# Patient Record
Sex: FEMALE | Race: WHITE | Employment: PART TIME | URBAN - METROPOLITAN AREA
[De-identification: names, ages, dates, MRNs, and addresses within clinical notes are randomized per-mention and may not be internally consistent; named-entity substitution may affect disease eponyms.]

---

## 2017-01-10 ENCOUNTER — HOSPITAL ENCOUNTER (OUTPATIENT)
Age: 27
Discharge: HOME | End: 2017-01-10
Attending: FAMILY MEDICINE
Payer: COMMERCIAL

## 2017-01-10 ENCOUNTER — APPOINTMENT (OUTPATIENT)
Dept: GENERAL RADIOLOGY | Age: 27
End: 2017-01-10
Attending: FAMILY MEDICINE
Payer: COMMERCIAL

## 2017-01-10 VITALS
RESPIRATION RATE: 20 BRPM | HEART RATE: 116 BPM | SYSTOLIC BLOOD PRESSURE: 133 MMHG | OXYGEN SATURATION: 98 % | TEMPERATURE: 98 F | DIASTOLIC BLOOD PRESSURE: 79 MMHG | WEIGHT: 270 LBS

## 2017-01-10 DIAGNOSIS — R05.8 POST-VIRAL COUGH SYNDROME: Primary | ICD-10-CM

## 2017-01-10 PROCEDURE — 99214 OFFICE O/P EST MOD 30 MIN: CPT

## 2017-01-10 PROCEDURE — 71020 XR CHEST PA + LAT CHEST (CPT=71020): CPT

## 2017-01-10 PROCEDURE — 99213 OFFICE O/P EST LOW 20 MIN: CPT

## 2017-01-10 NOTE — ED PROVIDER NOTES
Patient Seen in: [facility] END    History   Patient presents with:  Cough/URI    Stated Complaint: 3 DAYS COUGH,SINUS CONGESTION    HPI  78-year-old female coming in with complaints of continuous cough, and cough spasms.   She was s 98%        Physical Exam    GEN: Not in any acute distress, making good conversation, answering appropriately   SKIN: No pallor, no erythema, no cyanosis, warm and dry  Eyes: wnl, normal conjunctiva   HEAD: Normocephalic, atraumatic  EENT: OP - wnl, moist, nosebleeds discussed - to avoid / reduce the risk of this use fragrance free vaseline to apply over the nasal septal mucosa)  · Daily antihistamine - Claritin OR Zyrtec in the AM (non-drowsy) and take benadryl 25 mg at night time  · Avoid pseudoephedrine o

## 2017-01-10 NOTE — ED INITIAL ASSESSMENT (HPI)
Seen here the day after promise, diagnosed with sinus/uri. On 10 days of medication. Recently got a massage & the next day started feeling the face/sinus congestion more. No fever. No vomit.  No pending dental work

## 2017-01-14 ENCOUNTER — HOSPITAL ENCOUNTER (OUTPATIENT)
Age: 27
Discharge: HOME | End: 2017-01-14
Payer: COMMERCIAL

## 2017-01-14 VITALS
BODY MASS INDEX: 40.92 KG/M2 | WEIGHT: 270 LBS | SYSTOLIC BLOOD PRESSURE: 119 MMHG | HEIGHT: 68 IN | TEMPERATURE: 99 F | DIASTOLIC BLOOD PRESSURE: 75 MMHG | HEART RATE: 117 BPM | RESPIRATION RATE: 20 BRPM | OXYGEN SATURATION: 100 %

## 2017-01-14 DIAGNOSIS — J20.9 BRONCHITIS, ACUTE, WITH BRONCHOSPASM: Primary | ICD-10-CM

## 2017-01-14 PROCEDURE — 99214 OFFICE O/P EST MOD 30 MIN: CPT

## 2017-01-14 PROCEDURE — 94640 AIRWAY INHALATION TREATMENT: CPT

## 2017-01-14 RX ORDER — PREDNISONE 20 MG/1
60 TABLET ORAL ONCE
Status: COMPLETED | OUTPATIENT
Start: 2017-01-14 | End: 2017-01-14

## 2017-01-14 RX ORDER — AZITHROMYCIN 250 MG/1
TABLET, FILM COATED ORAL
Qty: 1 PACKAGE | Refills: 0 | Status: SHIPPED | OUTPATIENT
Start: 2017-01-14 | End: 2017-01-19

## 2017-01-14 RX ORDER — PREDNISONE 20 MG/1
40 TABLET ORAL DAILY
Qty: 8 TABLET | Refills: 0 | Status: SHIPPED | OUTPATIENT
Start: 2017-01-14 | End: 2017-01-18

## 2017-01-14 RX ORDER — ALBUTEROL SULFATE 90 UG/1
2 AEROSOL, METERED RESPIRATORY (INHALATION) EVERY 4 HOURS PRN
Qty: 1 INHALER | Refills: 0 | Status: SHIPPED | OUTPATIENT
Start: 2017-01-14 | End: 2017-02-13

## 2017-01-14 RX ORDER — MONTELUKAST SODIUM 10 MG/1
10 TABLET ORAL NIGHTLY
Qty: 30 TABLET | Refills: 0 | Status: SHIPPED | OUTPATIENT
Start: 2017-01-14 | End: 2017-05-20

## 2017-01-14 RX ORDER — IPRATROPIUM BROMIDE AND ALBUTEROL SULFATE 2.5; .5 MG/3ML; MG/3ML
3 SOLUTION RESPIRATORY (INHALATION) ONCE
Status: COMPLETED | OUTPATIENT
Start: 2017-01-14 | End: 2017-01-14

## 2017-01-14 NOTE — ED INITIAL ASSESSMENT (HPI)
12/26- diagnosed with sinus infection and Rx of antibiotics. Patient re-assessed last Tuesday with a non productive cough.   Chest Xray - negative  Patient wheezing  Patient states non smoker  Denies any asthma

## 2017-01-14 NOTE — ED PROVIDER NOTES
Patient Seen in: [address] [facility]    History   Patient presents with:  Cough    Stated Complaint: cough/sob      HPI    33 yo female here with c/o wheezing and was sent over from the walk-in clinic.  PT had a chest xray 2 days ago that Temporal   SpO2 01/14/17 1604 100 %   O2 Device 01/14/17 1604 None (Room air)       Current:/75 mmHg  Pulse 117  Temp(Src) 98.5 °F (36.9 °C) (Temporal)  Resp 20  Ht 172.7 cm (5' 8\")  Wt 122.471 kg  BMI 41.06 kg/m2  SpO2 100%        Physical Exam   C Oral Tab  Take 2 tablets (40 mg total) by mouth daily.   Qty: 8 tablet Refills: 0    azithromycin (ZITHROMAX Z-JAYLENE) 250 MG Oral Tab  500 mg once followed by 250 mg daily x 4 days  Qty: 1 Package Refills: 0    Montelukast Sodium 10 MG Oral Tab  Take 1 tablet

## 2017-03-04 ENCOUNTER — OFFICE VISIT (OUTPATIENT)
Dept: OBGYN CLINIC | Facility: CLINIC | Age: 27
End: 2017-03-04

## 2017-03-04 DIAGNOSIS — Z30.40 CONTRACEPTIVE SURVEILLANCE: Primary | ICD-10-CM

## 2017-03-04 PROCEDURE — 96372 THER/PROPH/DIAG INJ SC/IM: CPT | Performed by: OBSTETRICS & GYNECOLOGY

## 2017-03-04 RX ORDER — METHYLPREDNISOLONE ACETATE 40 MG/ML
40 INJECTION, SUSPENSION INTRA-ARTICULAR; INTRALESIONAL; INTRAMUSCULAR; SOFT TISSUE ONCE
Status: COMPLETED | OUTPATIENT
Start: 2017-03-04 | End: 2017-08-19

## 2017-03-04 RX ORDER — MEDROXYPROGESTERONE ACETATE 150 MG/ML
150 INJECTION, SUSPENSION INTRAMUSCULAR ONCE
Status: COMPLETED | OUTPATIENT
Start: 2017-03-04 | End: 2017-03-04

## 2017-03-04 RX ADMIN — MEDROXYPROGESTERONE ACETATE 150 MG: 150 INJECTION, SUSPENSION INTRAMUSCULAR at 10:31:00

## 2017-05-18 RX ORDER — MEDROXYPROGESTERONE ACETATE 150 MG/ML
INJECTION, SUSPENSION INTRAMUSCULAR
Qty: 1 ML | Refills: 0 | Status: SHIPPED | OUTPATIENT
Start: 2017-05-18 | End: 2017-05-20

## 2017-05-20 ENCOUNTER — OFFICE VISIT (OUTPATIENT)
Dept: OBGYN CLINIC | Facility: CLINIC | Age: 27
End: 2017-05-20

## 2017-05-20 DIAGNOSIS — Z30.013 ENCOUNTER FOR INITIAL PRESCRIPTION OF INJECTABLE CONTRACEPTIVE: ICD-10-CM

## 2017-05-20 DIAGNOSIS — Z30.40 ENCOUNTER FOR SURVEILLANCE OF CONTRACEPTIVES, UNSPECIFIED CONTRACEPTIVE: Primary | ICD-10-CM

## 2017-05-20 PROCEDURE — 96372 THER/PROPH/DIAG INJ SC/IM: CPT | Performed by: OBSTETRICS & GYNECOLOGY

## 2017-05-20 RX ORDER — MEDROXYPROGESTERONE ACETATE 150 MG/ML
150 INJECTION, SUSPENSION INTRAMUSCULAR ONCE
Status: DISCONTINUED | OUTPATIENT
Start: 2017-05-20 | End: 2017-05-20

## 2017-05-20 RX ORDER — MEDROXYPROGESTERONE ACETATE 150 MG/ML
150 INJECTION, SUSPENSION INTRAMUSCULAR ONCE
Status: COMPLETED | OUTPATIENT
Start: 2017-05-20 | End: 2017-05-20

## 2017-05-20 RX ADMIN — MEDROXYPROGESTERONE ACETATE 150 MG: 150 INJECTION, SUSPENSION INTRAMUSCULAR at 10:10:00

## 2017-06-13 ENCOUNTER — HOSPITAL ENCOUNTER (OUTPATIENT)
Age: 27
Discharge: HOME | End: 2017-06-13
Payer: COMMERCIAL

## 2017-06-13 VITALS
SYSTOLIC BLOOD PRESSURE: 126 MMHG | DIASTOLIC BLOOD PRESSURE: 84 MMHG | HEART RATE: 98 BPM | TEMPERATURE: 98 F | OXYGEN SATURATION: 99 % | RESPIRATION RATE: 18 BRPM

## 2017-06-13 DIAGNOSIS — H65.02 ACUTE SEROUS OTITIS MEDIA OF LEFT EAR, RECURRENCE NOT SPECIFIED: Primary | ICD-10-CM

## 2017-06-13 PROCEDURE — 69209 REMOVE IMPACTED EAR WAX UNI: CPT

## 2017-06-13 PROCEDURE — 99213 OFFICE O/P EST LOW 20 MIN: CPT

## 2017-06-13 PROCEDURE — 99214 OFFICE O/P EST MOD 30 MIN: CPT

## 2017-06-13 RX ORDER — CETIRIZINE HYDROCHLORIDE 10 MG/1
10 TABLET ORAL DAILY
COMMUNITY
End: 2020-01-07

## 2017-06-13 RX ORDER — AMOXICILLIN 875 MG/1
875 TABLET, COATED ORAL 2 TIMES DAILY
Qty: 20 TABLET | Refills: 0 | Status: SHIPPED | OUTPATIENT
Start: 2017-06-13 | End: 2017-06-23

## 2017-06-13 NOTE — ED PROVIDER NOTES
Patient Seen in: THE [facility] In [facility]    History   Patient presents with:  Sinus Problem  Ear Problem    Stated Complaint: SINUS INFECTION/LT EAR CLOGGED    The history is provided by the patient.    40-year-old female who presents to the INFECTION/LT EAR CLOGGED  Other systems are as noted in HPI. Constitutional and vital signs reviewed. All other systems reviewed and negative except as noted above. PSFH elements reviewed from today and agreed except as otherwise stated in HPI. states they understand diagnosis, followup plan and agree with and understand  discharge instructions and plan. I answered all of the patient's questions prior to discharge.    Disposition and Plan     Clinical Impression:  Acute serous otitis media of left

## 2017-06-13 NOTE — ED INITIAL ASSESSMENT (HPI)
Patient presents with cc of sinus congestion/pressure and left ear clogged since 6/11/17. States has been dealing with seasonal allergies for the past couple of weeks. No fever.

## 2017-07-03 ENCOUNTER — HOSPITAL ENCOUNTER (OUTPATIENT)
Age: 27
Discharge: HOME | End: 2017-07-03
Attending: FAMILY MEDICINE
Payer: COMMERCIAL

## 2017-07-03 VITALS
OXYGEN SATURATION: 99 % | SYSTOLIC BLOOD PRESSURE: 124 MMHG | TEMPERATURE: 98 F | RESPIRATION RATE: 18 BRPM | HEART RATE: 96 BPM | DIASTOLIC BLOOD PRESSURE: 85 MMHG

## 2017-07-03 DIAGNOSIS — H60.331 ACUTE SWIMMER'S EAR OF RIGHT SIDE: Primary | ICD-10-CM

## 2017-07-03 PROCEDURE — 99214 OFFICE O/P EST MOD 30 MIN: CPT

## 2017-07-03 PROCEDURE — 99213 OFFICE O/P EST LOW 20 MIN: CPT

## 2017-07-03 RX ORDER — CIPROFLOXACIN AND DEXAMETHASONE 3; 1 MG/ML; MG/ML
4 SUSPENSION/ DROPS AURICULAR (OTIC) 2 TIMES DAILY
Qty: 1 BOTTLE | Refills: 0 | Status: SHIPPED | OUTPATIENT
Start: 2017-07-03 | End: 2017-07-10

## 2017-07-03 NOTE — ED INITIAL ASSESSMENT (HPI)
Here for eval of left ear pain that initially started mid last month, which she was treated w/ antibiotics for. Sts that the pain improved, but never went away. Here for recheck of same ear.

## 2017-07-03 NOTE — ED PROVIDER NOTES
Patient Seen in: [facility]    History   Patient presents with:  Ear Problem Pain (neurosensory)    Stated Complaint: EAR PAIN     HPI    32year old female presents for left ear pain. states she has left ear pain 3 weeks ago .  Homa Thomas Mouth/Throat: Oropharynx is clear and moist.   Left EAC with swelling and erythema. Unable visualize TM. Tenderness with movement of pinna. Eyes: Conjunctivae and EOM are normal. Pupils are equal, round, and reactive to light.    Neck: Normal range of

## 2017-08-02 RX ORDER — MEDROXYPROGESTERONE ACETATE 150 MG/ML
INJECTION, SUSPENSION INTRAMUSCULAR
Qty: 1 ML | Refills: 0 | Status: SHIPPED | OUTPATIENT
Start: 2017-08-02 | End: 2018-05-12

## 2017-08-19 ENCOUNTER — NURSE ONLY (OUTPATIENT)
Dept: OBGYN CLINIC | Facility: CLINIC | Age: 27
End: 2017-08-19

## 2017-08-19 PROCEDURE — 96372 THER/PROPH/DIAG INJ SC/IM: CPT | Performed by: OBSTETRICS & GYNECOLOGY

## 2017-08-19 RX ADMIN — METHYLPREDNISOLONE ACETATE 40 MG: 40 INJECTION, SUSPENSION INTRA-ARTICULAR; INTRALESIONAL; INTRAMUSCULAR; SOFT TISSUE at 10:16:00

## 2017-11-07 ENCOUNTER — OFFICE VISIT (OUTPATIENT)
Dept: OBGYN CLINIC | Facility: CLINIC | Age: 27
End: 2017-11-07

## 2017-11-07 VITALS
HEIGHT: 68 IN | SYSTOLIC BLOOD PRESSURE: 130 MMHG | BODY MASS INDEX: 41.98 KG/M2 | HEART RATE: 88 BPM | DIASTOLIC BLOOD PRESSURE: 70 MMHG | WEIGHT: 277 LBS | RESPIRATION RATE: 18 BRPM

## 2017-11-07 DIAGNOSIS — Z01.419 WELL WOMAN EXAM WITH ROUTINE GYNECOLOGICAL EXAM: Primary | ICD-10-CM

## 2017-11-07 DIAGNOSIS — Z30.42 ENCOUNTER FOR SURVEILLANCE OF INJECTABLE CONTRACEPTIVE: ICD-10-CM

## 2017-11-07 DIAGNOSIS — F52.6 DYSPAREUNIA IN FEMALE NOT DUE TO SUBSTANCE OR KNOWN PHYSIOLOGICAL CONDITION: ICD-10-CM

## 2017-11-07 DIAGNOSIS — Z30.013 ENCOUNTER FOR INITIAL PRESCRIPTION OF INJECTABLE CONTRACEPTIVE: ICD-10-CM

## 2017-11-07 PROCEDURE — 88175 CYTOPATH C/V AUTO FLUID REDO: CPT | Performed by: OBSTETRICS & GYNECOLOGY

## 2017-11-07 PROCEDURE — 96372 THER/PROPH/DIAG INJ SC/IM: CPT | Performed by: OBSTETRICS & GYNECOLOGY

## 2017-11-07 PROCEDURE — 99395 PREV VISIT EST AGE 18-39: CPT | Performed by: OBSTETRICS & GYNECOLOGY

## 2017-11-07 RX ORDER — MEDROXYPROGESTERONE ACETATE 150 MG/ML
150 INJECTION, SUSPENSION INTRAMUSCULAR ONCE
Status: COMPLETED | OUTPATIENT
Start: 2017-11-07 | End: 2017-11-07

## 2017-11-07 RX ORDER — MEDROXYPROGESTERONE ACETATE 150 MG/ML
INJECTION, SUSPENSION INTRAMUSCULAR
Qty: 1 ML | Refills: 0 | OUTPATIENT
Start: 2017-11-07

## 2017-11-07 RX ADMIN — MEDROXYPROGESTERONE ACETATE 150 MG: 150 INJECTION, SUSPENSION INTRAMUSCULAR at 16:38:00

## 2017-11-07 NOTE — PROGRESS NOTES
Perri Lantigua is a 32year old female No obstetric history on file. No LMP recorded. Patient has had an injection. Patient presents with:  Wellness Visit: annual  .     Planes of dyspareunia.   She will underwent physical therapy with minimal itching  Skin/Breast:  Denies any breast pain, lumps, or discharge. Neurological:  denies headaches, extremity weakness or numbness.       PHYSICAL EXAM:   Constitutional: well developed, well nourished  Head/Face: normocephalic  Neck/Thyroid: thyroid sym

## 2018-02-17 ENCOUNTER — NURSE ONLY (OUTPATIENT)
Dept: OBGYN CLINIC | Facility: CLINIC | Age: 28
End: 2018-02-17

## 2018-02-17 DIAGNOSIS — Z30.013 ENCOUNTER FOR INITIAL PRESCRIPTION OF INJECTABLE CONTRACEPTIVE: Primary | ICD-10-CM

## 2018-02-17 PROCEDURE — 96372 THER/PROPH/DIAG INJ SC/IM: CPT | Performed by: OBSTETRICS & GYNECOLOGY

## 2018-02-17 RX ORDER — MEDROXYPROGESTERONE ACETATE 150 MG/ML
150 INJECTION, SUSPENSION INTRAMUSCULAR ONCE
Status: COMPLETED | OUTPATIENT
Start: 2018-02-17 | End: 2018-02-17

## 2018-02-17 RX ADMIN — MEDROXYPROGESTERONE ACETATE 150 MG: 150 INJECTION, SUSPENSION INTRAMUSCULAR at 12:21:00

## 2018-02-20 ENCOUNTER — HOSPITAL ENCOUNTER (OUTPATIENT)
Age: 28
Discharge: HOME | End: 2018-02-20
Attending: FAMILY MEDICINE
Payer: COMMERCIAL

## 2018-02-20 VITALS
TEMPERATURE: 99 F | RESPIRATION RATE: 18 BRPM | OXYGEN SATURATION: 100 % | HEART RATE: 118 BPM | SYSTOLIC BLOOD PRESSURE: 117 MMHG | DIASTOLIC BLOOD PRESSURE: 79 MMHG

## 2018-02-20 DIAGNOSIS — J06.9 VIRAL UPPER RESPIRATORY TRACT INFECTION: ICD-10-CM

## 2018-02-20 DIAGNOSIS — J98.01 ACUTE BRONCHOSPASM: Primary | ICD-10-CM

## 2018-02-20 PROCEDURE — 99214 OFFICE O/P EST MOD 30 MIN: CPT

## 2018-02-20 PROCEDURE — 94640 AIRWAY INHALATION TREATMENT: CPT

## 2018-02-20 RX ORDER — IPRATROPIUM BROMIDE AND ALBUTEROL SULFATE 2.5; .5 MG/3ML; MG/3ML
3 SOLUTION RESPIRATORY (INHALATION) ONCE
Status: COMPLETED | OUTPATIENT
Start: 2018-02-20 | End: 2018-02-20

## 2018-02-20 RX ORDER — CEPHALEXIN 500 MG/1
500 CAPSULE ORAL 4 TIMES DAILY
COMMUNITY
End: 2019-01-12

## 2018-02-20 RX ORDER — ALBUTEROL SULFATE 90 UG/1
2 AEROSOL, METERED RESPIRATORY (INHALATION) EVERY 4 HOURS PRN
Qty: 1 INHALER | Refills: 0 | Status: SHIPPED | OUTPATIENT
Start: 2018-02-20 | End: 2020-01-07

## 2018-02-20 NOTE — ED INITIAL ASSESSMENT (HPI)
Patient presents with cough and some beba x one day. No fever noted. No flu shot. +Aches.h/o bronchitis.

## 2018-02-20 NOTE — ED NOTES
Pt educated regarding medication, peak flows, and neb breathing technique. Pt verbalizes understanding and demonstrates good technique.

## 2018-02-20 NOTE — ED PROVIDER NOTES
Patient Seen in: [address] [facility]    History   Patient presents with:  Cough  Body ache and/or chills    Stated Complaint: 1 DAY COUGH,BODY ACHES,DIFFICULTY BREATHING    HPI    This 30-year-old female presents to the office with compl S4 or murmur noted. LUNGS: Occasional expiratory wheezing noted. No egophony noted. No retractions or tachypnea noted. ABDOMEN: Soft, nontender, no guarding, rigidity or rebound, no masses or hepatosplenomegaly, normal bowel sounds in all four quadrants. with you as this is your rescue medicine. Push fluids. Humidified Air. Saline nasal spray. You may use a Neti pot for sinus rinses with saline. You may alternate Tylenol with Ibuprofen every 3 hours for fever or pain.   Go to the closest Emergency Departm

## 2018-05-12 ENCOUNTER — NURSE ONLY (OUTPATIENT)
Dept: OBGYN CLINIC | Facility: CLINIC | Age: 28
End: 2018-05-12

## 2018-05-12 PROCEDURE — 96372 THER/PROPH/DIAG INJ SC/IM: CPT | Performed by: OBSTETRICS & GYNECOLOGY

## 2018-05-12 RX ORDER — MEDROXYPROGESTERONE ACETATE 150 MG/ML
150 INJECTION, SUSPENSION INTRAMUSCULAR ONCE
Status: COMPLETED | OUTPATIENT
Start: 2018-05-12 | End: 2018-05-12

## 2018-05-12 RX ORDER — MEDROXYPROGESTERONE ACETATE 150 MG/ML
1 INJECTION, SUSPENSION INTRAMUSCULAR ONCE
Qty: 1 ML | Refills: 0 | Status: SHIPPED | OUTPATIENT
Start: 2018-05-12 | End: 2018-05-12

## 2018-05-12 RX ORDER — MEDROXYPROGESTERONE ACETATE 150 MG/ML
INJECTION, SUSPENSION INTRAMUSCULAR ONCE
Status: CANCELLED | OUTPATIENT
Start: 2018-05-12 | End: 2018-05-12

## 2018-05-12 RX ADMIN — MEDROXYPROGESTERONE ACETATE 150 MG: 150 INJECTION, SUSPENSION INTRAMUSCULAR at 11:51:00

## 2018-08-04 ENCOUNTER — NURSE ONLY (OUTPATIENT)
Dept: OBGYN CLINIC | Facility: CLINIC | Age: 28
End: 2018-08-04
Payer: COMMERCIAL

## 2018-08-04 DIAGNOSIS — Z30.42 ENCOUNTER FOR SURVEILLANCE OF INJECTABLE CONTRACEPTIVE: Primary | ICD-10-CM

## 2018-08-04 PROCEDURE — 96372 THER/PROPH/DIAG INJ SC/IM: CPT | Performed by: OBSTETRICS & GYNECOLOGY

## 2018-08-04 RX ORDER — MEDROXYPROGESTERONE ACETATE 150 MG/ML
150 INJECTION, SUSPENSION INTRAMUSCULAR ONCE
Status: COMPLETED | OUTPATIENT
Start: 2018-08-04 | End: 2018-08-04

## 2018-08-04 RX ADMIN — MEDROXYPROGESTERONE ACETATE 150 MG: 150 INJECTION, SUSPENSION INTRAMUSCULAR at 12:39:00

## 2018-09-05 ENCOUNTER — HOSPITAL ENCOUNTER (OUTPATIENT)
Age: 28
Discharge: HOME | End: 2018-09-05
Attending: FAMILY MEDICINE
Payer: COMMERCIAL

## 2018-09-05 VITALS
SYSTOLIC BLOOD PRESSURE: 107 MMHG | OXYGEN SATURATION: 98 % | RESPIRATION RATE: 20 BRPM | TEMPERATURE: 98 F | HEART RATE: 94 BPM | DIASTOLIC BLOOD PRESSURE: 61 MMHG

## 2018-09-05 DIAGNOSIS — J01.90 ACUTE NON-RECURRENT SINUSITIS, UNSPECIFIED LOCATION: Primary | ICD-10-CM

## 2018-09-05 PROCEDURE — 99212 OFFICE O/P EST SF 10 MIN: CPT

## 2018-09-05 PROCEDURE — 99213 OFFICE O/P EST LOW 20 MIN: CPT

## 2018-09-05 RX ORDER — FLUTICASONE PROPIONATE 50 MCG
2 SPRAY, SUSPENSION (ML) NASAL DAILY
Qty: 16 G | Refills: 0 | Status: SHIPPED | OUTPATIENT
Start: 2018-09-05 | End: 2018-10-05

## 2018-09-05 NOTE — ED INITIAL ASSESSMENT (HPI)
Sinus pressure- since Sunday , denies fever, nasal congestion  Took mucinex and using neti pot. Also c/o sore thraot. Pt states her  was just dx with bronchitis.

## 2018-09-06 NOTE — ED PROVIDER NOTES
Patient Seen in: THE [facility] In [facility] END    History   Patient presents with:  Sinus Problem    Stated Complaint: 4 DAYS SINUS ISSUES    HPI    29year old female presents for sinus pressure, congestion and headache.  States symptoms started 3 range of motion. Neck supple. Cardiovascular: Normal rate, regular rhythm, normal heart sounds and intact distal pulses. Pulmonary/Chest: Effort normal and breath sounds normal.   Neurological: She is alert and oriented to person, place, and time.    More Grandchild

## 2018-09-26 ENCOUNTER — TELEPHONE (OUTPATIENT)
Dept: OBGYN CLINIC | Facility: CLINIC | Age: 28
End: 2018-09-26

## 2018-09-26 NOTE — TELEPHONE ENCOUNTER
Called pt to let her know the office will be closed on her appt day 10-27-18. Pt to call office back to reschedule appt.  Thanks

## 2018-10-23 ENCOUNTER — NURSE ONLY (OUTPATIENT)
Dept: OBGYN CLINIC | Facility: CLINIC | Age: 28
End: 2018-10-23
Payer: COMMERCIAL

## 2018-10-23 DIAGNOSIS — Z30.013 ENCOUNTER FOR INITIAL PRESCRIPTION OF INJECTABLE CONTRACEPTIVE: Primary | ICD-10-CM

## 2018-10-23 PROCEDURE — 96372 THER/PROPH/DIAG INJ SC/IM: CPT | Performed by: OBSTETRICS & GYNECOLOGY

## 2018-10-23 RX ORDER — MEDROXYPROGESTERONE ACETATE 150 MG/ML
150 INJECTION, SUSPENSION INTRAMUSCULAR ONCE
Status: COMPLETED | OUTPATIENT
Start: 2018-10-23 | End: 2018-10-23

## 2018-10-23 RX ADMIN — MEDROXYPROGESTERONE ACETATE 150 MG: 150 INJECTION, SUSPENSION INTRAMUSCULAR at 18:29:00

## 2019-01-12 ENCOUNTER — OFFICE VISIT (OUTPATIENT)
Dept: OBGYN CLINIC | Facility: CLINIC | Age: 29
End: 2019-01-12
Payer: COMMERCIAL

## 2019-01-12 VITALS
WEIGHT: 293 LBS | HEART RATE: 88 BPM | SYSTOLIC BLOOD PRESSURE: 114 MMHG | DIASTOLIC BLOOD PRESSURE: 68 MMHG | BODY MASS INDEX: 44.41 KG/M2 | HEIGHT: 68 IN

## 2019-01-12 DIAGNOSIS — Z30.013 ENCOUNTER FOR INITIAL PRESCRIPTION OF INJECTABLE CONTRACEPTIVE: Primary | ICD-10-CM

## 2019-01-12 DIAGNOSIS — Z30.40 ENCOUNTER FOR SURVEILLANCE OF CONTRACEPTIVES, UNSPECIFIED CONTRACEPTIVE: ICD-10-CM

## 2019-01-12 DIAGNOSIS — Z01.419 WELL WOMAN EXAM WITH ROUTINE GYNECOLOGICAL EXAM: ICD-10-CM

## 2019-01-12 PROCEDURE — 96372 THER/PROPH/DIAG INJ SC/IM: CPT | Performed by: OBSTETRICS & GYNECOLOGY

## 2019-01-12 PROCEDURE — 99395 PREV VISIT EST AGE 18-39: CPT | Performed by: OBSTETRICS & GYNECOLOGY

## 2019-01-12 RX ORDER — MEDROXYPROGESTERONE ACETATE 150 MG/ML
150 INJECTION, SUSPENSION INTRAMUSCULAR ONCE
Status: COMPLETED | OUTPATIENT
Start: 2019-01-12 | End: 2019-01-12

## 2019-01-12 RX ADMIN — MEDROXYPROGESTERONE ACETATE 150 MG: 150 INJECTION, SUSPENSION INTRAMUSCULAR at 12:45:00

## 2019-01-12 NOTE — PROGRESS NOTES
Parish Devlin is a 29year old female  No LMP recorded. Patient has had an injection. Patient presents with:  Wellness Visit  Other: depo inj  . Vitamin D and calcium were encouraged. She refuses the flu shot.   She is getting her Not Asked        Stress Concern: Not Asked        Weight Concern: Not Asked        Special Diet: Not Asked        Back Care: Not Asked        Exercise: Yes          7 d/wk        Bike Helmet: Not Asked        Seat Belt: Yes        Self-Exams: Not Asked tenderness on motion  Uterus: normal in size, contour, position, mobility, without tenderness  Adnexa: normal without obvious masses or tenderness  Perineum: normal  Anus: no hemorroids     Assessment & Plan:  There are no diagnoses linked to this encounte

## 2019-04-13 ENCOUNTER — NURSE ONLY (OUTPATIENT)
Dept: OBGYN CLINIC | Facility: CLINIC | Age: 29
End: 2019-04-13
Payer: COMMERCIAL

## 2019-04-13 VITALS
SYSTOLIC BLOOD PRESSURE: 122 MMHG | WEIGHT: 293 LBS | HEIGHT: 68 IN | BODY MASS INDEX: 44.41 KG/M2 | HEART RATE: 102 BPM | DIASTOLIC BLOOD PRESSURE: 80 MMHG

## 2019-04-13 PROCEDURE — 96372 THER/PROPH/DIAG INJ SC/IM: CPT | Performed by: OBSTETRICS & GYNECOLOGY

## 2019-04-13 RX ORDER — MEDROXYPROGESTERONE ACETATE 150 MG/ML
150 INJECTION, SUSPENSION INTRAMUSCULAR ONCE
Status: COMPLETED | OUTPATIENT
Start: 2019-04-13 | End: 2019-04-13

## 2019-04-13 RX ADMIN — MEDROXYPROGESTERONE ACETATE 150 MG: 150 INJECTION, SUSPENSION INTRAMUSCULAR at 12:48:00

## 2019-07-02 ENCOUNTER — NURSE ONLY (OUTPATIENT)
Dept: OBGYN CLINIC | Facility: CLINIC | Age: 29
End: 2019-07-02
Payer: COMMERCIAL

## 2019-07-02 VITALS
HEART RATE: 86 BPM | SYSTOLIC BLOOD PRESSURE: 110 MMHG | BODY MASS INDEX: 45 KG/M2 | DIASTOLIC BLOOD PRESSURE: 68 MMHG | WEIGHT: 293 LBS

## 2019-07-02 DIAGNOSIS — Z30.9 ENCOUNTER FOR CONTRACEPTIVE MANAGEMENT, UNSPECIFIED TYPE: Primary | ICD-10-CM

## 2019-07-02 PROCEDURE — 96372 THER/PROPH/DIAG INJ SC/IM: CPT | Performed by: OBSTETRICS & GYNECOLOGY

## 2019-07-02 RX ORDER — MEDROXYPROGESTERONE ACETATE 150 MG/ML
150 INJECTION, SUSPENSION INTRAMUSCULAR ONCE
Status: COMPLETED | OUTPATIENT
Start: 2019-07-02 | End: 2019-07-02

## 2019-07-02 RX ADMIN — MEDROXYPROGESTERONE ACETATE 150 MG: 150 INJECTION, SUSPENSION INTRAMUSCULAR at 12:30:00

## 2019-09-26 ENCOUNTER — NURSE ONLY (OUTPATIENT)
Dept: OBGYN CLINIC | Facility: CLINIC | Age: 29
End: 2019-09-26
Payer: COMMERCIAL

## 2019-09-26 VITALS
WEIGHT: 286 LBS | HEIGHT: 68 IN | TEMPERATURE: 99 F | RESPIRATION RATE: 25 BRPM | DIASTOLIC BLOOD PRESSURE: 66 MMHG | BODY MASS INDEX: 43.35 KG/M2 | SYSTOLIC BLOOD PRESSURE: 126 MMHG | HEART RATE: 86 BPM

## 2019-09-26 DIAGNOSIS — Z30.9 ENCOUNTER FOR CONTRACEPTIVE MANAGEMENT, UNSPECIFIED TYPE: Primary | ICD-10-CM

## 2019-09-26 DIAGNOSIS — Z23 NEED FOR VACCINATION: ICD-10-CM

## 2019-09-26 PROCEDURE — 90471 IMMUNIZATION ADMIN: CPT | Performed by: OBSTETRICS & GYNECOLOGY

## 2019-09-26 PROCEDURE — 90686 IIV4 VACC NO PRSV 0.5 ML IM: CPT | Performed by: OBSTETRICS & GYNECOLOGY

## 2019-09-26 PROCEDURE — 96372 THER/PROPH/DIAG INJ SC/IM: CPT | Performed by: OBSTETRICS & GYNECOLOGY

## 2019-09-26 RX ORDER — MEDROXYPROGESTERONE ACETATE 150 MG/ML
INJECTION, SUSPENSION INTRAMUSCULAR
COMMUNITY
Start: 2015-02-03 | End: 2021-01-22

## 2019-09-26 RX ORDER — MEDROXYPROGESTERONE ACETATE 150 MG/ML
150 INJECTION, SUSPENSION INTRAMUSCULAR ONCE
Status: COMPLETED | OUTPATIENT
Start: 2019-09-26 | End: 2019-09-26

## 2019-09-26 RX ADMIN — MEDROXYPROGESTERONE ACETATE 150 MG: 150 INJECTION, SUSPENSION INTRAMUSCULAR at 13:58:00

## 2019-11-14 ENCOUNTER — HOSPITAL ENCOUNTER (OUTPATIENT)
Age: 29
Discharge: HOME | End: 2019-11-14
Attending: EMERGENCY MEDICINE
Payer: COMMERCIAL

## 2019-11-14 VITALS
SYSTOLIC BLOOD PRESSURE: 133 MMHG | TEMPERATURE: 98 F | RESPIRATION RATE: 18 BRPM | HEART RATE: 99 BPM | OXYGEN SATURATION: 99 % | DIASTOLIC BLOOD PRESSURE: 75 MMHG

## 2019-11-14 DIAGNOSIS — R21 RASH: Primary | ICD-10-CM

## 2019-11-14 PROCEDURE — 99214 OFFICE O/P EST MOD 30 MIN: CPT

## 2019-11-14 PROCEDURE — 99213 OFFICE O/P EST LOW 20 MIN: CPT

## 2019-11-14 RX ORDER — FAMOTIDINE 20 MG/1
20 TABLET ORAL ONCE
Status: COMPLETED | OUTPATIENT
Start: 2019-11-14 | End: 2019-11-14

## 2019-11-14 RX ORDER — PREDNISONE 20 MG/1
60 TABLET ORAL ONCE
Status: COMPLETED | OUTPATIENT
Start: 2019-11-14 | End: 2019-11-14

## 2019-11-14 RX ORDER — PREDNISONE 20 MG/1
40 TABLET ORAL DAILY
Qty: 10 TABLET | Refills: 0 | Status: SHIPPED | OUTPATIENT
Start: 2019-11-14 | End: 2019-11-19

## 2019-11-15 NOTE — ED PROVIDER NOTES
Patient Seen in: [address] In [facility]      History   Patient presents with:  Rash    Stated Complaint: GENERALIZED RASH    HPI    This is a 35-year female that presents with a rash on her arms that spread to her back starting early this m no rales, no retractions, and no wheezing. HEART:  Regular rate and rhythm. S1 and S2. No murmurs, no rubs or gallops. ABDOMEN: Soft, nontender and nondistended. Normoactive bowel sounds. No rebound. No guarding.    EXTREMITIES: Warm with brisk capillary

## 2019-11-15 NOTE — ED INITIAL ASSESSMENT (HPI)
C/o itchy rashes to both arms and hands and back started this morning. Took Benadryl with some relief. No shortness of breath.

## 2019-12-19 ENCOUNTER — NURSE ONLY (OUTPATIENT)
Dept: OBGYN CLINIC | Facility: CLINIC | Age: 29
End: 2019-12-19
Payer: COMMERCIAL

## 2019-12-19 DIAGNOSIS — Z30.09 EVALUATION REGARDING CONTRACEPTION OPTIONS: Primary | ICD-10-CM

## 2019-12-19 PROCEDURE — 96372 THER/PROPH/DIAG INJ SC/IM: CPT | Performed by: OBSTETRICS & GYNECOLOGY

## 2019-12-19 RX ORDER — MEDROXYPROGESTERONE ACETATE 150 MG/ML
150 INJECTION, SUSPENSION INTRAMUSCULAR ONCE
Status: COMPLETED | OUTPATIENT
Start: 2019-12-19 | End: 2019-12-19

## 2019-12-19 RX ADMIN — MEDROXYPROGESTERONE ACETATE 150 MG: 150 INJECTION, SUSPENSION INTRAMUSCULAR at 13:43:00

## 2020-01-07 ENCOUNTER — OFFICE VISIT (OUTPATIENT)
Dept: OBGYN CLINIC | Facility: CLINIC | Age: 30
End: 2020-01-07
Payer: COMMERCIAL

## 2020-01-07 VITALS
BODY MASS INDEX: 44.1 KG/M2 | WEIGHT: 291 LBS | DIASTOLIC BLOOD PRESSURE: 66 MMHG | SYSTOLIC BLOOD PRESSURE: 122 MMHG | HEIGHT: 68 IN

## 2020-01-07 DIAGNOSIS — Z01.419 ENCOUNTER FOR WELL WOMAN EXAM WITH ROUTINE GYNECOLOGICAL EXAM: Primary | ICD-10-CM

## 2020-01-07 DIAGNOSIS — Z12.4 CERVICAL CANCER SCREENING: ICD-10-CM

## 2020-01-07 PROCEDURE — 87624 HPV HI-RISK TYP POOLED RSLT: CPT | Performed by: OBSTETRICS & GYNECOLOGY

## 2020-01-07 PROCEDURE — 88175 CYTOPATH C/V AUTO FLUID REDO: CPT | Performed by: OBSTETRICS & GYNECOLOGY

## 2020-01-07 PROCEDURE — 99395 PREV VISIT EST AGE 18-39: CPT | Performed by: OBSTETRICS & GYNECOLOGY

## 2020-01-07 NOTE — PROGRESS NOTES
Amirah Gustafson is a 34year old female  No LMP recorded. Patient has had an injection. Patient presents with:  Wellness Visit  .   Patient is very anxious about pelvic exam, had gone to PT for pelvic relaxation before, sexually active, file        Attends Congregational service: Not on file        Active member of club or organization: Not on file        Attends meetings of clubs or organizations: Not on file        Relationship status: Not on file      Intimate partner violence:        Fear itching  Musculoskeletal:  denies back pain. Skin/Breast:  Denies any breast pain, lumps, or discharge. Neurological:  denies headaches, extremity weakness or numbness. Psychiatric: denies depression or anxiety.   Endocrine:   denies excessive thirst or

## 2020-01-08 LAB — HPV I/H RISK 1 DNA SPEC QL NAA+PROBE: NEGATIVE

## 2020-02-21 ENCOUNTER — APPOINTMENT (OUTPATIENT)
Dept: CT IMAGING | Facility: HOSPITAL | Age: 30
End: 2020-02-21
Attending: EMERGENCY MEDICINE
Payer: COMMERCIAL

## 2020-02-21 ENCOUNTER — HOSPITAL ENCOUNTER (OUTPATIENT)
Age: 30
Discharge: ANOTHER HOSPITAL | End: 2020-02-21
Attending: FAMILY MEDICINE
Payer: COMMERCIAL

## 2020-02-21 ENCOUNTER — HOSPITAL ENCOUNTER (EMERGENCY)
Facility: HOSPITAL | Age: 30
Discharge: HOME | End: 2020-02-22
Attending: EMERGENCY MEDICINE
Payer: COMMERCIAL

## 2020-02-21 ENCOUNTER — APPOINTMENT (OUTPATIENT)
Dept: ULTRASOUND IMAGING | Facility: HOSPITAL | Age: 30
End: 2020-02-21
Attending: PHYSICIAN ASSISTANT
Payer: COMMERCIAL

## 2020-02-21 ENCOUNTER — APPOINTMENT (OUTPATIENT)
Dept: GENERAL RADIOLOGY | Facility: HOSPITAL | Age: 30
End: 2020-02-21
Attending: PHYSICIAN ASSISTANT
Payer: COMMERCIAL

## 2020-02-21 VITALS
BODY MASS INDEX: 43.19 KG/M2 | OXYGEN SATURATION: 100 % | TEMPERATURE: 98 F | HEART RATE: 86 BPM | RESPIRATION RATE: 16 BRPM | DIASTOLIC BLOOD PRESSURE: 85 MMHG | SYSTOLIC BLOOD PRESSURE: 125 MMHG | WEIGHT: 285 LBS | HEIGHT: 68 IN

## 2020-02-21 DIAGNOSIS — R10.2 PELVIC PAIN: Primary | ICD-10-CM

## 2020-02-21 DIAGNOSIS — R07.81 PLEURITIC CHEST PAIN: Primary | ICD-10-CM

## 2020-02-21 DIAGNOSIS — N12 PYELONEPHRITIS: ICD-10-CM

## 2020-02-21 DIAGNOSIS — R10.32 ABDOMINAL PAIN, ACUTE, LEFT LOWER QUADRANT: ICD-10-CM

## 2020-02-21 LAB
ALBUMIN SERPL-MCNC: 3.4 G/DL (ref 3.4–5)
ALBUMIN/GLOB SERPL: 0.8 {RATIO} (ref 1–2)
ALP LIVER SERPL-CCNC: 93 U/L (ref 37–98)
ALT SERPL-CCNC: 38 U/L (ref 13–56)
ANION GAP SERPL CALC-SCNC: 6 MMOL/L (ref 0–18)
AST SERPL-CCNC: 19 U/L (ref 15–37)
BASOPHILS # BLD AUTO: 0.06 X10(3) UL (ref 0–0.2)
BASOPHILS NFR BLD AUTO: 0.6 %
BILIRUB SERPL-MCNC: 0.3 MG/DL (ref 0.1–2)
BILIRUB UR QL STRIP.AUTO: NEGATIVE
BUN BLD-MCNC: 14 MG/DL (ref 7–18)
BUN/CREAT SERPL: 13.7 (ref 10–20)
CALCIUM BLD-MCNC: 9.5 MG/DL (ref 8.5–10.1)
CHLORIDE SERPL-SCNC: 108 MMOL/L (ref 98–112)
CLARITY UR REFRACT.AUTO: CLEAR
CO2 SERPL-SCNC: 25 MMOL/L (ref 21–32)
COLOR UR AUTO: YELLOW
CREAT BLD-MCNC: 1.02 MG/DL (ref 0.55–1.02)
DEPRECATED RDW RBC AUTO: 41.1 FL (ref 35.1–46.3)
EOSINOPHIL # BLD AUTO: 0.18 X10(3) UL (ref 0–0.7)
EOSINOPHIL NFR BLD AUTO: 1.9 %
ERYTHROCYTE [DISTWIDTH] IN BLOOD BY AUTOMATED COUNT: 12.7 % (ref 11–15)
GLOBULIN PLAS-MCNC: 4.4 G/DL (ref 2.8–4.4)
GLUCOSE BLD-MCNC: 95 MG/DL (ref 70–99)
GLUCOSE UR STRIP.AUTO-MCNC: NEGATIVE MG/DL
HCT VFR BLD AUTO: 41.8 % (ref 35–48)
HGB BLD-MCNC: 13.5 G/DL (ref 12–16)
IMM GRANULOCYTES # BLD AUTO: 0.04 X10(3) UL (ref 0–1)
IMM GRANULOCYTES NFR BLD: 0.4 %
KETONES UR STRIP.AUTO-MCNC: NEGATIVE MG/DL
LYMPHOCYTES # BLD AUTO: 2.6 X10(3) UL (ref 1–4)
LYMPHOCYTES NFR BLD AUTO: 27.7 %
M PROTEIN MFR SERPL ELPH: 7.8 G/DL (ref 6.4–8.2)
MCH RBC QN AUTO: 28.5 PG (ref 26–34)
MCHC RBC AUTO-ENTMCNC: 32.3 G/DL (ref 31–37)
MCV RBC AUTO: 88.2 FL (ref 80–100)
MONOCYTES # BLD AUTO: 0.82 X10(3) UL (ref 0.1–1)
MONOCYTES NFR BLD AUTO: 8.8 %
NEUTROPHILS # BLD AUTO: 5.67 X10 (3) UL (ref 1.5–7.7)
NEUTROPHILS # BLD AUTO: 5.67 X10(3) UL (ref 1.5–7.7)
NEUTROPHILS NFR BLD AUTO: 60.6 %
NITRITE UR QL STRIP.AUTO: NEGATIVE
OSMOLALITY SERPL CALC.SUM OF ELEC: 288 MOSM/KG (ref 275–295)
PH UR STRIP.AUTO: 6 [PH] (ref 4.5–8)
PLATELET # BLD AUTO: 228 10(3)UL (ref 150–450)
POCT URINE PREGNANCY: NEGATIVE
POTASSIUM SERPL-SCNC: 3.8 MMOL/L (ref 3.5–5.1)
PROT UR STRIP.AUTO-MCNC: NEGATIVE MG/DL
RBC # BLD AUTO: 4.74 X10(6)UL (ref 3.8–5.3)
SODIUM SERPL-SCNC: 139 MMOL/L (ref 136–145)
SP GR UR STRIP.AUTO: 1.02 (ref 1–1.03)
UROBILINOGEN UR STRIP.AUTO-MCNC: <2 MG/DL
WBC # BLD AUTO: 9.4 X10(3) UL (ref 4–11)
WBC #/AREA URNS AUTO: >50 /HPF
WBC CLUMPS UR QL AUTO: PRESENT

## 2020-02-21 PROCEDURE — 76856 US EXAM PELVIC COMPLETE: CPT | Performed by: PHYSICIAN ASSISTANT

## 2020-02-21 PROCEDURE — 85025 COMPLETE CBC W/AUTO DIFF WBC: CPT | Performed by: PHYSICIAN ASSISTANT

## 2020-02-21 PROCEDURE — 99214 OFFICE O/P EST MOD 30 MIN: CPT

## 2020-02-21 PROCEDURE — 81025 URINE PREGNANCY TEST: CPT

## 2020-02-21 PROCEDURE — 71046 X-RAY EXAM CHEST 2 VIEWS: CPT | Performed by: PHYSICIAN ASSISTANT

## 2020-02-21 PROCEDURE — 93005 ELECTROCARDIOGRAM TRACING: CPT

## 2020-02-21 PROCEDURE — 93975 VASCULAR STUDY: CPT | Performed by: PHYSICIAN ASSISTANT

## 2020-02-21 PROCEDURE — 96365 THER/PROPH/DIAG IV INF INIT: CPT

## 2020-02-21 PROCEDURE — 99284 EMERGENCY DEPT VISIT MOD MDM: CPT

## 2020-02-21 PROCEDURE — 81001 URINALYSIS AUTO W/SCOPE: CPT | Performed by: EMERGENCY MEDICINE

## 2020-02-21 PROCEDURE — 81001 URINALYSIS AUTO W/SCOPE: CPT

## 2020-02-21 PROCEDURE — 87086 URINE CULTURE/COLONY COUNT: CPT | Performed by: EMERGENCY MEDICINE

## 2020-02-21 PROCEDURE — 93010 ELECTROCARDIOGRAM REPORT: CPT

## 2020-02-21 PROCEDURE — 80053 COMPREHEN METABOLIC PANEL: CPT | Performed by: PHYSICIAN ASSISTANT

## 2020-02-21 PROCEDURE — 74176 CT ABD & PELVIS W/O CONTRAST: CPT | Performed by: EMERGENCY MEDICINE

## 2020-02-21 RX ORDER — DIPHENHYDRAMINE HCL 25 MG
25 CAPSULE ORAL EVERY 6 HOURS PRN
COMMUNITY
End: 2021-05-04

## 2020-02-22 VITALS
WEIGHT: 285 LBS | OXYGEN SATURATION: 96 % | HEIGHT: 68 IN | HEART RATE: 84 BPM | SYSTOLIC BLOOD PRESSURE: 112 MMHG | BODY MASS INDEX: 43.19 KG/M2 | DIASTOLIC BLOOD PRESSURE: 81 MMHG | RESPIRATION RATE: 18 BRPM | TEMPERATURE: 98 F

## 2020-02-22 LAB
ATRIAL RATE: 82 BPM
P AXIS: 52 DEGREES
P-R INTERVAL: 138 MS
Q-T INTERVAL: 376 MS
QRS DURATION: 84 MS
QTC CALCULATION (BEZET): 439 MS
R AXIS: 36 DEGREES
T AXIS: 31 DEGREES
VENTRICULAR RATE: 82 BPM

## 2020-02-22 RX ORDER — SULFAMETHOXAZOLE AND TRIMETHOPRIM 800; 160 MG/1; MG/1
1 TABLET ORAL 2 TIMES DAILY
Qty: 14 TABLET | Refills: 0 | Status: SHIPPED | OUTPATIENT
Start: 2020-02-22 | End: 2020-02-29

## 2020-02-22 NOTE — ED INITIAL ASSESSMENT (HPI)
Pt with sharp L side pain x 30 min - pain starts under L armpit (at ribcage) and shoots all the way down to L hip; a little nausea; painful to take a deep breath    A little cough today; no fever/vomiting; last bm today was normal

## 2020-02-22 NOTE — ED NOTES
Pt moved from D pod to ED bed C7; pt is aox4; ambulatory. Awaiting results of ultrasound. Pt has IV access.

## 2020-02-22 NOTE — ED NOTES
Pt instructed to go directly to [facility] ED and stay npo; pt verbalized understanding of instructions.

## 2020-02-22 NOTE — ED INITIAL ASSESSMENT (HPI)
Pt to ED sent from [address] [place] [address] for further eval of L lower abdominal pain radiating to L lower rib area onset 1915 tonight. EKG done at  showed NSR. Hx of Ovarian Cyst 8 yrs ago.

## 2020-02-22 NOTE — ED PROVIDER NOTES
Patient Seen in: [facility] Emergency Department      History   Patient presents with:  Abdomen/Flank Pain    Stated Complaint: abd pain radiating to L ribs, denies chest pain; sent from IC history of ovaria*    HPI    CHIEF COMPLAINT: Left lower qu Past Surgical History:   Procedure Laterality Date   • OTHER SURGICAL HISTORY  05/23/2018    LT eye SX                    Social History    Tobacco Use      Smoking status: Never Smoker      Smokeless tobacco: Never Used    Alcohol use: Yes      Junior (*)     WBC Clump Present (*)     All other components within normal limits   COMP METABOLIC PANEL (14) - Abnormal; Notable for the following components:    A/G Ratio 0.8 (*)     All other components within normal limits   CBC WITH DIFFERENTIAL WITH PLATEL quadrant pain and concern for possible ovarian cyst.  She had an IV line established and blood work was performed. She was noted to have a normal CBC and CMP.   Urinalysis appeared contaminated, as there were a large amount of squamous epithelial cells pre

## 2020-02-22 NOTE — PROGRESS NOTES
[facility] Note: Antimicrobial Weight Based Dose Adjustment for: ceftriaxone (ROCEPHIN)    Renu Nicole is a 34year old female who has been prescribed ceftriaxone (ROCEPHIN) 1 g once.     Estimated Creatinine Clearance: 82.1 mL/min (based

## 2020-02-22 NOTE — ED PROVIDER NOTES
Patient Seen in: [facility]      History   Patient presents with:  Pain  Cough/URI    Stated Complaint: SIDE PAIN    HPI    30-year-old female previously healthy presents with acute onset of left-sided rib pain, upper chest pain Pulse 86   Resp 16   Temp 98.4 °F (36.9 °C)   Temp src Temporal   SpO2 100 %   O2 Device None (Room air)       Current:/85   Pulse 86   Temp 98.4 °F (36.9 °C) (Temporal)   Resp 16   Ht 172.7 cm (5' 8\")   Wt 129.3 kg   SpO2 100%   Breastfeeding No to ed  2/21/2020  8:04 pm    [facility]  [place]  [phone]  Go today          Medications Prescribed:  Current Discharge Medication List

## 2020-03-10 ENCOUNTER — NURSE ONLY (OUTPATIENT)
Dept: OBGYN CLINIC | Facility: CLINIC | Age: 30
End: 2020-03-10
Payer: COMMERCIAL

## 2020-03-10 DIAGNOSIS — Z30.42 ENCOUNTER FOR SURVEILLANCE OF INJECTABLE CONTRACEPTIVE: Primary | ICD-10-CM

## 2020-03-10 PROCEDURE — 96372 THER/PROPH/DIAG INJ SC/IM: CPT | Performed by: OBSTETRICS & GYNECOLOGY

## 2020-03-10 RX ORDER — MEDROXYPROGESTERONE ACETATE 150 MG/ML
150 INJECTION, SUSPENSION INTRAMUSCULAR ONCE
Status: COMPLETED | OUTPATIENT
Start: 2020-03-10 | End: 2020-03-10

## 2020-03-10 RX ADMIN — MEDROXYPROGESTERONE ACETATE 150 MG: 150 INJECTION, SUSPENSION INTRAMUSCULAR at 11:37:00

## 2020-05-26 ENCOUNTER — NURSE ONLY (OUTPATIENT)
Dept: OBGYN CLINIC | Facility: CLINIC | Age: 30
End: 2020-05-26
Payer: COMMERCIAL

## 2020-05-26 VITALS
WEIGHT: 293 LBS | BODY MASS INDEX: 44.41 KG/M2 | HEART RATE: 91 BPM | SYSTOLIC BLOOD PRESSURE: 115 MMHG | DIASTOLIC BLOOD PRESSURE: 76 MMHG | HEIGHT: 68 IN

## 2020-05-26 DIAGNOSIS — Z30.40 ENCOUNTER FOR SURVEILLANCE OF CONTRACEPTIVES, UNSPECIFIED CONTRACEPTIVE: Primary | ICD-10-CM

## 2020-05-26 PROCEDURE — 96372 THER/PROPH/DIAG INJ SC/IM: CPT | Performed by: OBSTETRICS & GYNECOLOGY

## 2020-05-26 RX ORDER — MEDROXYPROGESTERONE ACETATE 150 MG/ML
150 INJECTION, SUSPENSION INTRAMUSCULAR ONCE
Status: COMPLETED | OUTPATIENT
Start: 2020-05-26 | End: 2020-05-26

## 2020-05-26 RX ADMIN — MEDROXYPROGESTERONE ACETATE 150 MG: 150 INJECTION, SUSPENSION INTRAMUSCULAR at 11:44:00

## 2020-08-14 ENCOUNTER — NURSE ONLY (OUTPATIENT)
Dept: OBGYN CLINIC | Facility: CLINIC | Age: 30
End: 2020-08-14
Payer: COMMERCIAL

## 2020-08-14 VITALS
WEIGHT: 293 LBS | HEIGHT: 68 IN | HEART RATE: 91 BPM | BODY MASS INDEX: 44.41 KG/M2 | SYSTOLIC BLOOD PRESSURE: 118 MMHG | DIASTOLIC BLOOD PRESSURE: 76 MMHG

## 2020-08-14 DIAGNOSIS — Z30.42 DEPO-PROVERA CONTRACEPTIVE STATUS: Primary | ICD-10-CM

## 2020-08-14 PROCEDURE — 3078F DIAST BP <80 MM HG: CPT | Performed by: OBSTETRICS & GYNECOLOGY

## 2020-08-14 PROCEDURE — 96372 THER/PROPH/DIAG INJ SC/IM: CPT | Performed by: OBSTETRICS & GYNECOLOGY

## 2020-08-14 PROCEDURE — 3008F BODY MASS INDEX DOCD: CPT | Performed by: OBSTETRICS & GYNECOLOGY

## 2020-08-14 PROCEDURE — 3074F SYST BP LT 130 MM HG: CPT | Performed by: OBSTETRICS & GYNECOLOGY

## 2020-08-14 RX ORDER — MEDROXYPROGESTERONE ACETATE 150 MG/ML
150 INJECTION, SUSPENSION INTRAMUSCULAR ONCE
Status: COMPLETED | OUTPATIENT
Start: 2020-08-14 | End: 2020-08-14

## 2020-08-14 RX ADMIN — MEDROXYPROGESTERONE ACETATE 150 MG: 150 INJECTION, SUSPENSION INTRAMUSCULAR at 13:16:00

## 2020-10-29 ENCOUNTER — NURSE ONLY (OUTPATIENT)
Dept: OBGYN CLINIC | Facility: CLINIC | Age: 30
End: 2020-10-29
Payer: COMMERCIAL

## 2020-10-29 VITALS
HEIGHT: 68 IN | WEIGHT: 293 LBS | SYSTOLIC BLOOD PRESSURE: 116 MMHG | BODY MASS INDEX: 44.41 KG/M2 | DIASTOLIC BLOOD PRESSURE: 60 MMHG

## 2020-10-29 DIAGNOSIS — Z30.42 ENCOUNTER FOR SURVEILLANCE OF INJECTABLE CONTRACEPTIVE: Primary | ICD-10-CM

## 2020-10-29 DIAGNOSIS — Z23 NEED FOR VACCINATION: ICD-10-CM

## 2020-10-29 PROCEDURE — 3008F BODY MASS INDEX DOCD: CPT | Performed by: OBSTETRICS & GYNECOLOGY

## 2020-10-29 PROCEDURE — 90686 IIV4 VACC NO PRSV 0.5 ML IM: CPT | Performed by: OBSTETRICS & GYNECOLOGY

## 2020-10-29 PROCEDURE — 3074F SYST BP LT 130 MM HG: CPT | Performed by: OBSTETRICS & GYNECOLOGY

## 2020-10-29 PROCEDURE — 3078F DIAST BP <80 MM HG: CPT | Performed by: OBSTETRICS & GYNECOLOGY

## 2020-10-29 PROCEDURE — 90471 IMMUNIZATION ADMIN: CPT | Performed by: OBSTETRICS & GYNECOLOGY

## 2020-10-29 PROCEDURE — 96372 THER/PROPH/DIAG INJ SC/IM: CPT | Performed by: OBSTETRICS & GYNECOLOGY

## 2020-10-29 RX ORDER — MEDROXYPROGESTERONE ACETATE 150 MG/ML
150 INJECTION, SUSPENSION INTRAMUSCULAR ONCE
Status: COMPLETED | OUTPATIENT
Start: 2020-10-29 | End: 2020-10-29

## 2020-10-29 RX ADMIN — MEDROXYPROGESTERONE ACETATE 150 MG: 150 INJECTION, SUSPENSION INTRAMUSCULAR at 12:19:00

## 2021-01-12 ENCOUNTER — IMMUNIZATION (OUTPATIENT)
Dept: LAB | Age: 31
End: 2021-01-12

## 2021-01-12 DIAGNOSIS — Z23 NEED FOR VACCINATION: Primary | ICD-10-CM

## 2021-01-12 PROCEDURE — 91301 COVID-19 MODERNA VACCINE: CPT

## 2021-01-12 PROCEDURE — 0011A COVID-19 MODERNA VACCINE: CPT

## 2021-01-22 ENCOUNTER — NURSE ONLY (OUTPATIENT)
Dept: OBGYN CLINIC | Facility: CLINIC | Age: 31
End: 2021-01-22
Payer: COMMERCIAL

## 2021-01-22 VITALS
DIASTOLIC BLOOD PRESSURE: 66 MMHG | BODY MASS INDEX: 47 KG/M2 | HEART RATE: 75 BPM | SYSTOLIC BLOOD PRESSURE: 100 MMHG | WEIGHT: 293 LBS

## 2021-01-22 DIAGNOSIS — Z30.42 ENCOUNTER FOR SURVEILLANCE OF INJECTABLE CONTRACEPTIVE: Primary | ICD-10-CM

## 2021-01-22 PROCEDURE — 3074F SYST BP LT 130 MM HG: CPT | Performed by: OBSTETRICS & GYNECOLOGY

## 2021-01-22 PROCEDURE — 96372 THER/PROPH/DIAG INJ SC/IM: CPT | Performed by: OBSTETRICS & GYNECOLOGY

## 2021-01-22 PROCEDURE — 3078F DIAST BP <80 MM HG: CPT | Performed by: OBSTETRICS & GYNECOLOGY

## 2021-01-22 RX ORDER — MEDROXYPROGESTERONE ACETATE 150 MG/ML
150 INJECTION, SUSPENSION INTRAMUSCULAR ONCE
Status: COMPLETED | OUTPATIENT
Start: 2021-01-22 | End: 2021-01-22

## 2021-01-22 RX ADMIN — MEDROXYPROGESTERONE ACETATE 150 MG: 150 INJECTION, SUSPENSION INTRAMUSCULAR at 13:26:00

## 2021-02-15 ENCOUNTER — IMMUNIZATION (OUTPATIENT)
Dept: LAB | Age: 31
End: 2021-02-15
Attending: HOSPITALIST

## 2021-02-15 DIAGNOSIS — Z23 NEED FOR VACCINATION: Primary | ICD-10-CM

## 2021-02-15 PROCEDURE — 91301 COVID-19 MODERNA VACCINE: CPT

## 2021-02-15 PROCEDURE — 0012A COVID-19 MODERNA VACCINE: CPT

## 2021-04-19 ENCOUNTER — NURSE ONLY (OUTPATIENT)
Dept: OBGYN CLINIC | Facility: CLINIC | Age: 31
End: 2021-04-19
Payer: COMMERCIAL

## 2021-04-19 VITALS
HEART RATE: 78 BPM | BODY MASS INDEX: 44.41 KG/M2 | SYSTOLIC BLOOD PRESSURE: 124 MMHG | DIASTOLIC BLOOD PRESSURE: 72 MMHG | HEIGHT: 68 IN | WEIGHT: 293 LBS

## 2021-04-19 DIAGNOSIS — Z30.42 ENCOUNTER FOR DEPO-PROVERA CONTRACEPTION: Primary | ICD-10-CM

## 2021-04-19 PROCEDURE — 96372 THER/PROPH/DIAG INJ SC/IM: CPT | Performed by: OBSTETRICS & GYNECOLOGY

## 2021-04-19 PROCEDURE — 3008F BODY MASS INDEX DOCD: CPT | Performed by: OBSTETRICS & GYNECOLOGY

## 2021-04-19 PROCEDURE — 3078F DIAST BP <80 MM HG: CPT | Performed by: OBSTETRICS & GYNECOLOGY

## 2021-04-19 PROCEDURE — 3074F SYST BP LT 130 MM HG: CPT | Performed by: OBSTETRICS & GYNECOLOGY

## 2021-04-19 RX ORDER — MEDROXYPROGESTERONE ACETATE 150 MG/ML
150 INJECTION, SUSPENSION INTRAMUSCULAR ONCE
Status: COMPLETED | OUTPATIENT
Start: 2021-04-19 | End: 2021-04-19

## 2021-04-19 RX ADMIN — MEDROXYPROGESTERONE ACETATE 150 MG: 150 INJECTION, SUSPENSION INTRAMUSCULAR at 12:50:00

## 2021-05-04 ENCOUNTER — OFFICE VISIT (OUTPATIENT)
Dept: OBGYN CLINIC | Facility: CLINIC | Age: 31
End: 2021-05-04
Payer: COMMERCIAL

## 2021-05-04 VITALS
BODY MASS INDEX: 44.41 KG/M2 | DIASTOLIC BLOOD PRESSURE: 66 MMHG | WEIGHT: 293 LBS | HEIGHT: 68 IN | HEART RATE: 101 BPM | SYSTOLIC BLOOD PRESSURE: 118 MMHG

## 2021-05-04 DIAGNOSIS — Z01.419 ENCOUNTER FOR WELL WOMAN EXAM WITH ROUTINE GYNECOLOGICAL EXAM: Primary | ICD-10-CM

## 2021-05-04 PROBLEM — E66.01 CLASS 3 SEVERE OBESITY IN ADULT (HCC): Status: ACTIVE | Noted: 2021-05-04

## 2021-05-04 PROCEDURE — 3074F SYST BP LT 130 MM HG: CPT | Performed by: OBSTETRICS & GYNECOLOGY

## 2021-05-04 PROCEDURE — 3008F BODY MASS INDEX DOCD: CPT | Performed by: OBSTETRICS & GYNECOLOGY

## 2021-05-04 PROCEDURE — 3078F DIAST BP <80 MM HG: CPT | Performed by: OBSTETRICS & GYNECOLOGY

## 2021-05-04 PROCEDURE — 99395 PREV VISIT EST AGE 18-39: CPT | Performed by: OBSTETRICS & GYNECOLOGY

## 2021-05-04 RX ORDER — ESCITALOPRAM OXALATE 10 MG/1
TABLET ORAL
COMMUNITY
Start: 2021-04-21

## 2021-05-04 NOTE — PROGRESS NOTES
Robbi Sacks is a 27year old female [facility] Patient's last menstrual period was 05/12/2020. Patient presents with:  Wellness Visit  .   Patient has been seeing psychologist and recently started going to psychiatrist - feels she getting bett file    Social Determinants of Health  Financial Resource Strain:       Difficulty of Paying Living Expenses:   Food Insecurity:       Worried About Running Out of Food in the Last Year:       Ran Out of Food in the Last Year:   Transportation Needs:       vaginal discharge, vaginal itching  Musculoskeletal:  denies back pain. Skin/Breast:  Denies any breast pain, lumps, or discharge. Neurological:  denies headaches, extremity weakness or numbness. Psychiatric: denies depression or anxiety.   Endocrine:

## 2021-07-12 ENCOUNTER — NURSE ONLY (OUTPATIENT)
Dept: OBGYN CLINIC | Facility: CLINIC | Age: 31
End: 2021-07-12
Payer: COMMERCIAL

## 2021-07-12 VITALS
SYSTOLIC BLOOD PRESSURE: 118 MMHG | DIASTOLIC BLOOD PRESSURE: 60 MMHG | BODY MASS INDEX: 44.41 KG/M2 | HEART RATE: 104 BPM | WEIGHT: 293 LBS | HEIGHT: 68 IN

## 2021-07-12 DIAGNOSIS — Z30.42 ENCOUNTER FOR SURVEILLANCE OF INJECTABLE CONTRACEPTIVE: Primary | ICD-10-CM

## 2021-07-12 PROCEDURE — 3078F DIAST BP <80 MM HG: CPT | Performed by: OBSTETRICS & GYNECOLOGY

## 2021-07-12 PROCEDURE — 3074F SYST BP LT 130 MM HG: CPT | Performed by: OBSTETRICS & GYNECOLOGY

## 2021-07-12 PROCEDURE — 3008F BODY MASS INDEX DOCD: CPT | Performed by: OBSTETRICS & GYNECOLOGY

## 2021-07-12 PROCEDURE — 96372 THER/PROPH/DIAG INJ SC/IM: CPT | Performed by: OBSTETRICS & GYNECOLOGY

## 2021-07-12 RX ORDER — MEDROXYPROGESTERONE ACETATE 150 MG/ML
150 INJECTION, SUSPENSION INTRAMUSCULAR ONCE
Status: COMPLETED | OUTPATIENT
Start: 2021-07-12 | End: 2021-07-12

## 2021-07-12 RX ADMIN — MEDROXYPROGESTERONE ACETATE 150 MG: 150 INJECTION, SUSPENSION INTRAMUSCULAR at 13:46:00

## 2021-10-04 ENCOUNTER — NURSE ONLY (OUTPATIENT)
Dept: OBGYN CLINIC | Facility: CLINIC | Age: 31
End: 2021-10-04
Payer: COMMERCIAL

## 2021-10-04 VITALS
HEIGHT: 68 IN | HEART RATE: 90 BPM | SYSTOLIC BLOOD PRESSURE: 118 MMHG | DIASTOLIC BLOOD PRESSURE: 62 MMHG | BODY MASS INDEX: 49 KG/M2

## 2021-10-04 DIAGNOSIS — Z30.013 ENCOUNTER FOR INITIAL PRESCRIPTION OF INJECTABLE CONTRACEPTIVE: Primary | ICD-10-CM

## 2021-10-04 DIAGNOSIS — Z23 NEED FOR VACCINATION: ICD-10-CM

## 2021-10-04 PROCEDURE — 90471 IMMUNIZATION ADMIN: CPT | Performed by: OBSTETRICS & GYNECOLOGY

## 2021-10-04 PROCEDURE — 96372 THER/PROPH/DIAG INJ SC/IM: CPT | Performed by: OBSTETRICS & GYNECOLOGY

## 2021-10-04 PROCEDURE — 3074F SYST BP LT 130 MM HG: CPT | Performed by: OBSTETRICS & GYNECOLOGY

## 2021-10-04 PROCEDURE — 90686 IIV4 VACC NO PRSV 0.5 ML IM: CPT | Performed by: OBSTETRICS & GYNECOLOGY

## 2021-10-04 PROCEDURE — 3078F DIAST BP <80 MM HG: CPT | Performed by: OBSTETRICS & GYNECOLOGY

## 2021-10-04 RX ORDER — MEDROXYPROGESTERONE ACETATE 150 MG/ML
150 INJECTION, SUSPENSION INTRAMUSCULAR ONCE
Status: COMPLETED | OUTPATIENT
Start: 2021-10-04 | End: 2021-10-04

## 2021-10-04 RX ADMIN — MEDROXYPROGESTERONE ACETATE 150 MG: 150 INJECTION, SUSPENSION INTRAMUSCULAR at 14:00:00

## 2021-12-20 ENCOUNTER — NURSE ONLY (OUTPATIENT)
Dept: OBGYN CLINIC | Facility: CLINIC | Age: 31
End: 2021-12-20

## 2021-12-20 VITALS
HEIGHT: 68 IN | HEART RATE: 88 BPM | WEIGHT: 293 LBS | BODY MASS INDEX: 44.41 KG/M2 | SYSTOLIC BLOOD PRESSURE: 112 MMHG | DIASTOLIC BLOOD PRESSURE: 76 MMHG

## 2021-12-20 DIAGNOSIS — Z30.42 ENCOUNTER FOR DEPO-PROVERA CONTRACEPTION: Primary | ICD-10-CM

## 2021-12-20 PROCEDURE — 3008F BODY MASS INDEX DOCD: CPT | Performed by: OBSTETRICS & GYNECOLOGY

## 2021-12-20 PROCEDURE — 3078F DIAST BP <80 MM HG: CPT | Performed by: OBSTETRICS & GYNECOLOGY

## 2021-12-20 PROCEDURE — 3074F SYST BP LT 130 MM HG: CPT | Performed by: OBSTETRICS & GYNECOLOGY

## 2021-12-20 PROCEDURE — 96372 THER/PROPH/DIAG INJ SC/IM: CPT | Performed by: OBSTETRICS & GYNECOLOGY

## 2021-12-20 RX ORDER — MEDROXYPROGESTERONE ACETATE 150 MG/ML
150 INJECTION, SUSPENSION INTRAMUSCULAR ONCE
Status: COMPLETED | OUTPATIENT
Start: 2021-12-20 | End: 2021-12-20

## 2021-12-20 RX ADMIN — MEDROXYPROGESTERONE ACETATE 150 MG: 150 INJECTION, SUSPENSION INTRAMUSCULAR at 14:32:00

## 2022-06-30 ENCOUNTER — OFFICE VISIT (OUTPATIENT)
Dept: INTERNAL MEDICINE CLINIC | Facility: CLINIC | Age: 32
End: 2022-06-30
Payer: COMMERCIAL

## 2022-06-30 ENCOUNTER — LAB ENCOUNTER (OUTPATIENT)
Dept: LAB | Age: 32
End: 2022-06-30
Attending: PHYSICIAN ASSISTANT
Payer: COMMERCIAL

## 2022-06-30 VITALS
RESPIRATION RATE: 16 BRPM | WEIGHT: 293 LBS | BODY MASS INDEX: 43.9 KG/M2 | SYSTOLIC BLOOD PRESSURE: 110 MMHG | TEMPERATURE: 98 F | HEART RATE: 118 BPM | OXYGEN SATURATION: 98 % | DIASTOLIC BLOOD PRESSURE: 82 MMHG | HEIGHT: 68.5 IN

## 2022-06-30 DIAGNOSIS — E05.90 HYPERTHYROIDISM: ICD-10-CM

## 2022-06-30 DIAGNOSIS — E05.90 HYPERTHYROIDISM: Primary | ICD-10-CM

## 2022-06-30 DIAGNOSIS — Z30.09 ENCOUNTER FOR OTHER GENERAL COUNSELING OR ADVICE ON CONTRACEPTION: ICD-10-CM

## 2022-06-30 DIAGNOSIS — R74.8 ELEVATED LIVER ENZYMES: ICD-10-CM

## 2022-06-30 DIAGNOSIS — E66.01 MORBID OBESITY WITH BMI OF 45.0-49.9, ADULT (HCC): ICD-10-CM

## 2022-06-30 DIAGNOSIS — F32.A DEPRESSION, UNSPECIFIED DEPRESSION TYPE: ICD-10-CM

## 2022-06-30 LAB
ALBUMIN SERPL-MCNC: 3.2 G/DL (ref 3.4–5)
ALP LIVER SERPL-CCNC: 77 U/L
ALT SERPL-CCNC: 92 U/L
AST SERPL-CCNC: 55 U/L (ref 15–37)
BILIRUB DIRECT SERPL-MCNC: <0.1 MG/DL (ref 0–0.2)
BILIRUB SERPL-MCNC: 0.4 MG/DL (ref 0.1–2)
HAV IGM SER QL: NONREACTIVE
HBV CORE IGM SER QL: NONREACTIVE
HBV SURFACE AG SERPL QL IA: NONREACTIVE
HCV AB SERPL QL IA: NONREACTIVE
PROT SERPL-MCNC: 7.2 G/DL (ref 6.4–8.2)

## 2022-06-30 PROCEDURE — 86800 THYROGLOBULIN ANTIBODY: CPT

## 2022-06-30 PROCEDURE — 80074 ACUTE HEPATITIS PANEL: CPT

## 2022-06-30 PROCEDURE — 36415 COLL VENOUS BLD VENIPUNCTURE: CPT

## 2022-06-30 PROCEDURE — 3079F DIAST BP 80-89 MM HG: CPT | Performed by: PHYSICIAN ASSISTANT

## 2022-06-30 PROCEDURE — 3008F BODY MASS INDEX DOCD: CPT | Performed by: PHYSICIAN ASSISTANT

## 2022-06-30 PROCEDURE — 86038 ANTINUCLEAR ANTIBODIES: CPT

## 2022-06-30 PROCEDURE — 80076 HEPATIC FUNCTION PANEL: CPT

## 2022-06-30 PROCEDURE — 3074F SYST BP LT 130 MM HG: CPT | Performed by: PHYSICIAN ASSISTANT

## 2022-06-30 PROCEDURE — 83516 IMMUNOASSAY NONANTIBODY: CPT

## 2022-06-30 PROCEDURE — 82784 ASSAY IGA/IGD/IGG/IGM EACH: CPT

## 2022-06-30 PROCEDURE — 86376 MICROSOMAL ANTIBODY EACH: CPT

## 2022-06-30 PROCEDURE — 99214 OFFICE O/P EST MOD 30 MIN: CPT | Performed by: PHYSICIAN ASSISTANT

## 2022-06-30 RX ORDER — PREDNISOLONE ACETATE 10 MG/ML
SUSPENSION/ DROPS OPHTHALMIC
COMMUNITY
Start: 2022-06-22

## 2022-06-30 NOTE — PATIENT INSTRUCTIONS
Please start vitamin D3 - 2,000 IU daily. Please use [employer] or call Damien Carmona at [phone] to set up the following tests:  - blood work  - liver ultrasound    Please focus on a diet consisting of lean or plant based proteins, fruits, veggies, and whole grains, as well as regular exercise (30 minutes daily).

## 2022-07-01 LAB
IMMUNOGLOBULIN PNL SER-MCNC: 995 MG/DL (ref 791–1643)
THYROGLOB SERPL-MCNC: 151 U/ML (ref ?–60)
THYROPEROXIDASE AB SERPL-ACNC: 519 U/ML (ref ?–60)

## 2022-07-02 LAB — F-ACTIN (SMOOTH MUSCLE) AB: 4 UNITS

## 2022-07-06 LAB — ANA SER QL: NEGATIVE

## 2022-07-08 DIAGNOSIS — E05.90 HYPERTHYROIDISM: Primary | ICD-10-CM

## 2022-07-13 ENCOUNTER — HOSPITAL ENCOUNTER (OUTPATIENT)
Dept: ULTRASOUND IMAGING | Age: 32
Discharge: HOME | End: 2022-07-13
Attending: PHYSICIAN ASSISTANT
Payer: COMMERCIAL

## 2022-07-13 DIAGNOSIS — E05.90 HYPERTHYROIDISM: ICD-10-CM

## 2022-07-13 PROCEDURE — 76536 US EXAM OF HEAD AND NECK: CPT | Performed by: PHYSICIAN ASSISTANT

## 2022-07-27 ENCOUNTER — HOSPITAL ENCOUNTER (OUTPATIENT)
Dept: ULTRASOUND IMAGING | Age: 32
Discharge: HOME | End: 2022-07-27
Attending: PHYSICIAN ASSISTANT
Payer: COMMERCIAL

## 2022-07-27 DIAGNOSIS — R74.8 ELEVATED LIVER ENZYMES: ICD-10-CM

## 2022-07-27 PROCEDURE — 76705 ECHO EXAM OF ABDOMEN: CPT | Performed by: PHYSICIAN ASSISTANT

## 2022-07-27 PROCEDURE — 76981 USE PARENCHYMA: CPT | Performed by: PHYSICIAN ASSISTANT

## 2022-08-10 ENCOUNTER — OFFICE VISIT (OUTPATIENT)
Dept: ENDOCRINOLOGY CLINIC | Facility: CLINIC | Age: 32
End: 2022-08-10
Payer: COMMERCIAL

## 2022-08-10 ENCOUNTER — LAB ENCOUNTER (OUTPATIENT)
Dept: LAB | Age: 32
End: 2022-08-10
Attending: STUDENT IN AN ORGANIZED HEALTH CARE EDUCATION/TRAINING PROGRAM
Payer: COMMERCIAL

## 2022-08-10 VITALS
BODY MASS INDEX: 48 KG/M2 | DIASTOLIC BLOOD PRESSURE: 82 MMHG | WEIGHT: 293 LBS | SYSTOLIC BLOOD PRESSURE: 108 MMHG | HEART RATE: 102 BPM

## 2022-08-10 DIAGNOSIS — E04.1 THYROID NODULE: ICD-10-CM

## 2022-08-10 DIAGNOSIS — E05.90 THYROTOXICOSIS WITHOUT THYROID STORM, UNSPECIFIED THYROTOXICOSIS TYPE: ICD-10-CM

## 2022-08-10 DIAGNOSIS — E05.90 THYROTOXICOSIS WITHOUT THYROID STORM, UNSPECIFIED THYROTOXICOSIS TYPE: Primary | ICD-10-CM

## 2022-08-10 LAB
T3 SERPL-MCNC: 170 NG/DL (ref 60–181)
T4 FREE SERPL-MCNC: 1.8 NG/DL (ref 0.8–1.7)
TSI SER-ACNC: <0.005 MIU/ML (ref 0.36–3.74)

## 2022-08-10 PROCEDURE — 84443 ASSAY THYROID STIM HORMONE: CPT | Performed by: STUDENT IN AN ORGANIZED HEALTH CARE EDUCATION/TRAINING PROGRAM

## 2022-08-10 PROCEDURE — 3079F DIAST BP 80-89 MM HG: CPT | Performed by: STUDENT IN AN ORGANIZED HEALTH CARE EDUCATION/TRAINING PROGRAM

## 2022-08-10 PROCEDURE — 3074F SYST BP LT 130 MM HG: CPT | Performed by: STUDENT IN AN ORGANIZED HEALTH CARE EDUCATION/TRAINING PROGRAM

## 2022-08-10 PROCEDURE — 84480 ASSAY TRIIODOTHYRONINE (T3): CPT | Performed by: STUDENT IN AN ORGANIZED HEALTH CARE EDUCATION/TRAINING PROGRAM

## 2022-08-10 PROCEDURE — 99205 OFFICE O/P NEW HI 60 MIN: CPT | Performed by: STUDENT IN AN ORGANIZED HEALTH CARE EDUCATION/TRAINING PROGRAM

## 2022-08-10 PROCEDURE — 84445 ASSAY OF TSI GLOBULIN: CPT | Performed by: STUDENT IN AN ORGANIZED HEALTH CARE EDUCATION/TRAINING PROGRAM

## 2022-08-10 PROCEDURE — 84439 ASSAY OF FREE THYROXINE: CPT | Performed by: STUDENT IN AN ORGANIZED HEALTH CARE EDUCATION/TRAINING PROGRAM

## 2022-08-13 LAB — THYROID STIMULATING IMMUNOGLOBULIN: 3.33 IU/L

## 2022-08-15 ENCOUNTER — PATIENT MESSAGE (OUTPATIENT)
Dept: ENDOCRINOLOGY CLINIC | Facility: CLINIC | Age: 32
End: 2022-08-15

## 2022-08-15 DIAGNOSIS — E05.00 GRAVES DISEASE: Primary | ICD-10-CM

## 2022-08-16 RX ORDER — METHIMAZOLE 10 MG/1
10 TABLET ORAL DAILY
Qty: 90 TABLET | Refills: 0 | Status: SHIPPED | OUTPATIENT
Start: 2022-08-16

## 2022-08-16 NOTE — TELEPHONE ENCOUNTER
From: William Danielle  To: Pauly Pereira MD  Sent: 8/15/2022 5:16 PM CDT  Subject: Question regarding ASSAY, THYROID STIM HORMONE    Antonia,    Thank you for letting me know. I will go in for blood work the week of September 26th. I am also scheduled to see Dr. Yaneht Erazo the week prior to that. Do you know if she would like me to keep that appointment or schedule it for after the blood work?      Thanks!    -Barrett Acosta

## 2022-08-16 NOTE — TELEPHONE ENCOUNTER
Spoke with patient on telephone (see result notes). Next appointment scheduled is actually 11/15. To keep that appointment, have labs done in 6 weeks.

## 2022-09-28 ENCOUNTER — LAB ENCOUNTER (OUTPATIENT)
Dept: LAB | Age: 32
End: 2022-09-28
Attending: INTERNAL MEDICINE
Payer: COMMERCIAL

## 2022-09-28 DIAGNOSIS — E05.00 GRAVES DISEASE: ICD-10-CM

## 2022-09-28 LAB
T3 SERPL-MCNC: 101 NG/DL (ref 60–181)
T4 FREE SERPL-MCNC: 1.1 NG/DL (ref 0.8–1.7)
TSI SER-ACNC: 0.01 MIU/ML (ref 0.36–3.74)

## 2022-09-28 PROCEDURE — 84443 ASSAY THYROID STIM HORMONE: CPT

## 2022-09-28 PROCEDURE — 36415 COLL VENOUS BLD VENIPUNCTURE: CPT

## 2022-09-28 PROCEDURE — 84480 ASSAY TRIIODOTHYRONINE (T3): CPT

## 2022-09-28 PROCEDURE — 84439 ASSAY OF FREE THYROXINE: CPT

## 2022-10-03 DIAGNOSIS — E05.90 THYROTOXICOSIS WITHOUT THYROID STORM, UNSPECIFIED THYROTOXICOSIS TYPE: Primary | ICD-10-CM

## 2022-10-31 RX ORDER — ATENOLOL 25 MG/1
25 TABLET ORAL DAILY
Qty: 90 TABLET | Refills: 0 | Status: SHIPPED | OUTPATIENT
Start: 2022-10-31

## 2022-11-03 ENCOUNTER — OFFICE VISIT (OUTPATIENT)
Dept: INTERNAL MEDICINE CLINIC | Facility: CLINIC | Age: 32
End: 2022-11-03
Payer: COMMERCIAL

## 2022-11-03 VITALS
WEIGHT: 293 LBS | HEART RATE: 75 BPM | BODY MASS INDEX: 43.4 KG/M2 | SYSTOLIC BLOOD PRESSURE: 100 MMHG | DIASTOLIC BLOOD PRESSURE: 82 MMHG | HEIGHT: 69 IN | TEMPERATURE: 99 F | RESPIRATION RATE: 16 BRPM | OXYGEN SATURATION: 99 %

## 2022-11-03 DIAGNOSIS — E05.00 GRAVES DISEASE: ICD-10-CM

## 2022-11-03 DIAGNOSIS — K76.0 NAFLD (NONALCOHOLIC FATTY LIVER DISEASE): ICD-10-CM

## 2022-11-03 DIAGNOSIS — E66.01 MORBID OBESITY WITH BMI OF 45.0-49.9, ADULT (HCC): ICD-10-CM

## 2022-11-03 DIAGNOSIS — Z00.00 ANNUAL PHYSICAL EXAM: Primary | ICD-10-CM

## 2022-11-03 PROCEDURE — 3079F DIAST BP 80-89 MM HG: CPT | Performed by: PHYSICIAN ASSISTANT

## 2022-11-03 PROCEDURE — 3074F SYST BP LT 130 MM HG: CPT | Performed by: PHYSICIAN ASSISTANT

## 2022-11-03 PROCEDURE — 99395 PREV VISIT EST AGE 18-39: CPT | Performed by: PHYSICIAN ASSISTANT

## 2022-11-03 PROCEDURE — 99214 OFFICE O/P EST MOD 30 MIN: CPT | Performed by: PHYSICIAN ASSISTANT

## 2022-11-03 PROCEDURE — 3008F BODY MASS INDEX DOCD: CPT | Performed by: PHYSICIAN ASSISTANT

## 2022-11-03 RX ORDER — METHIMAZOLE 10 MG/1
5 TABLET ORAL DAILY
COMMUNITY
Start: 2022-11-03

## 2022-11-03 RX ORDER — SEMAGLUTIDE 1.34 MG/ML
INJECTION, SOLUTION SUBCUTANEOUS
Qty: 1.5 ML | Refills: 0 | Status: SHIPPED | COMMUNITY
Start: 2022-11-03 | End: 2022-11-03

## 2022-11-03 RX ORDER — SEMAGLUTIDE 1.34 MG/ML
INJECTION, SOLUTION SUBCUTANEOUS
Qty: 1.5 ML | Refills: 0 | COMMUNITY
Start: 2022-11-03 | End: 2022-12-15

## 2022-11-03 NOTE — PATIENT INSTRUCTIONS
Blood work for Dr. Korina Almeida and Roula Corrales next week. Start Ozempic 0.25 mg once a week x 4 weeks then increase to 0.5 mg weekly. See Roula Corrales in 6 weeks for follow up visit.

## 2022-11-03 NOTE — PROGRESS NOTES
Provided 1:1 Ozempic education to the pt. Discussed proper storage, safety check of the pen prior to first use, priming, proper technique for administration, and proper disposal of used needles. Pt self-administered her first dose of 0.25 mg subcutaneously today. Proper technique was observed, no complications witnessed. Discussed potential side effects and ways to manage them. Encouraged pt to call the office if she experiences side effects that are not managed by OTC treatments. Pt v/u and agreed with this plan. Pt brought her sample supply and AVS instructions with her when leaving the office.

## 2022-11-09 ENCOUNTER — LAB ENCOUNTER (OUTPATIENT)
Dept: LAB | Age: 32
End: 2022-11-09
Attending: PHYSICIAN ASSISTANT
Payer: COMMERCIAL

## 2022-11-09 DIAGNOSIS — E05.00 GRAVES DISEASE: ICD-10-CM

## 2022-11-09 DIAGNOSIS — E66.01 MORBID OBESITY WITH BMI OF 45.0-49.9, ADULT (HCC): ICD-10-CM

## 2022-11-09 DIAGNOSIS — E05.90 THYROTOXICOSIS WITHOUT THYROID STORM, UNSPECIFIED THYROTOXICOSIS TYPE: ICD-10-CM

## 2022-11-09 DIAGNOSIS — K76.0 NAFLD (NONALCOHOLIC FATTY LIVER DISEASE): ICD-10-CM

## 2022-11-09 DIAGNOSIS — Z00.00 ANNUAL PHYSICAL EXAM: ICD-10-CM

## 2022-11-09 LAB
ALBUMIN SERPL-MCNC: 3.5 G/DL (ref 3.4–5)
ALBUMIN/GLOB SERPL: 0.9 {RATIO} (ref 1–2)
ALP LIVER SERPL-CCNC: 113 U/L
ALT SERPL-CCNC: 35 U/L
ANION GAP SERPL CALC-SCNC: 7 MMOL/L (ref 0–18)
AST SERPL-CCNC: 16 U/L (ref 15–37)
BASOPHILS # BLD AUTO: 0.05 X10(3) UL (ref 0–0.2)
BASOPHILS NFR BLD AUTO: 0.6 %
BILIRUB SERPL-MCNC: 0.6 MG/DL (ref 0.1–2)
BUN BLD-MCNC: 11 MG/DL (ref 7–18)
CALCIUM BLD-MCNC: 9.4 MG/DL (ref 8.5–10.1)
CHLORIDE SERPL-SCNC: 105 MMOL/L (ref 98–112)
CO2 SERPL-SCNC: 26 MMOL/L (ref 21–32)
CREAT BLD-MCNC: 1.02 MG/DL
EOSINOPHIL # BLD AUTO: 0.18 X10(3) UL (ref 0–0.7)
EOSINOPHIL NFR BLD AUTO: 2.1 %
ERYTHROCYTE [DISTWIDTH] IN BLOOD BY AUTOMATED COUNT: 14.2 %
EST. AVERAGE GLUCOSE BLD GHB EST-MCNC: 128 MG/DL (ref 68–126)
FASTING STATUS PATIENT QL REPORTED: YES
GFR SERPLBLD BASED ON 1.73 SQ M-ARVRAT: 75 ML/MIN/1.73M2 (ref 60–?)
GLOBULIN PLAS-MCNC: 3.7 G/DL (ref 2.8–4.4)
GLUCOSE BLD-MCNC: 104 MG/DL (ref 70–99)
HBA1C MFR BLD: 6.1 % (ref ?–5.7)
HCT VFR BLD AUTO: 43.8 %
HGB BLD-MCNC: 13.5 G/DL
IMM GRANULOCYTES # BLD AUTO: 0.05 X10(3) UL (ref 0–1)
IMM GRANULOCYTES NFR BLD: 0.6 %
LYMPHOCYTES # BLD AUTO: 2.17 X10(3) UL (ref 1–4)
LYMPHOCYTES NFR BLD AUTO: 25.6 %
MCH RBC QN AUTO: 27.1 PG (ref 26–34)
MCHC RBC AUTO-ENTMCNC: 30.8 G/DL (ref 31–37)
MCV RBC AUTO: 87.8 FL
MONOCYTES # BLD AUTO: 0.46 X10(3) UL (ref 0.1–1)
MONOCYTES NFR BLD AUTO: 5.4 %
NEUTROPHILS # BLD AUTO: 5.57 X10 (3) UL (ref 1.5–7.7)
NEUTROPHILS # BLD AUTO: 5.57 X10(3) UL (ref 1.5–7.7)
NEUTROPHILS NFR BLD AUTO: 65.7 %
OSMOLALITY SERPL CALC.SUM OF ELEC: 286 MOSM/KG (ref 275–295)
PLATELET # BLD AUTO: 181 10(3)UL (ref 150–450)
POTASSIUM SERPL-SCNC: 4.4 MMOL/L (ref 3.5–5.1)
PROT SERPL-MCNC: 7.2 G/DL (ref 6.4–8.2)
RBC # BLD AUTO: 4.99 X10(6)UL
SODIUM SERPL-SCNC: 138 MMOL/L (ref 136–145)
T3 SERPL-MCNC: 94 NG/DL (ref 60–181)
T4 FREE SERPL-MCNC: 0.9 NG/DL (ref 0.8–1.7)
TSI SER-ACNC: 0.65 MIU/ML (ref 0.36–3.74)
WBC # BLD AUTO: 8.5 X10(3) UL (ref 4–11)

## 2022-11-09 PROCEDURE — 36415 COLL VENOUS BLD VENIPUNCTURE: CPT

## 2022-11-09 PROCEDURE — 84480 ASSAY TRIIODOTHYRONINE (T3): CPT

## 2022-11-09 PROCEDURE — 84439 ASSAY OF FREE THYROXINE: CPT

## 2022-11-09 PROCEDURE — 80053 COMPREHEN METABOLIC PANEL: CPT

## 2022-11-09 PROCEDURE — 84443 ASSAY THYROID STIM HORMONE: CPT

## 2022-11-09 PROCEDURE — 85025 COMPLETE CBC W/AUTO DIFF WBC: CPT

## 2022-11-09 PROCEDURE — 83036 HEMOGLOBIN GLYCOSYLATED A1C: CPT

## 2022-11-11 DIAGNOSIS — E05.00 GRAVES DISEASE: ICD-10-CM

## 2022-11-11 NOTE — TELEPHONE ENCOUNTER
LOV  8/10/22    Future Appointments   Date Time Provider Riddhi Summer   11/15/2022  2:20 PM Bassam Aldana MD [facility] [place]   12/15/2022 12:00 PM Kai Perez DO [facility] [place]     Dose decreased from 10mg daily to 5 mg daily on 9/28

## 2022-11-14 RX ORDER — METHIMAZOLE 10 MG/1
5 TABLET ORAL DAILY
Qty: 90 TABLET | Refills: 0 | Status: SHIPPED | OUTPATIENT
Start: 2022-11-14

## 2022-11-15 ENCOUNTER — OFFICE VISIT (OUTPATIENT)
Dept: ENDOCRINOLOGY CLINIC | Facility: CLINIC | Age: 32
End: 2022-11-15
Payer: COMMERCIAL

## 2022-11-15 VITALS
DIASTOLIC BLOOD PRESSURE: 84 MMHG | WEIGHT: 293 LBS | SYSTOLIC BLOOD PRESSURE: 108 MMHG | HEART RATE: 96 BPM | BODY MASS INDEX: 47 KG/M2

## 2022-11-15 DIAGNOSIS — E05.00 GRAVES DISEASE: Primary | ICD-10-CM

## 2022-11-15 PROCEDURE — 3079F DIAST BP 80-89 MM HG: CPT | Performed by: STUDENT IN AN ORGANIZED HEALTH CARE EDUCATION/TRAINING PROGRAM

## 2022-11-15 PROCEDURE — 3074F SYST BP LT 130 MM HG: CPT | Performed by: STUDENT IN AN ORGANIZED HEALTH CARE EDUCATION/TRAINING PROGRAM

## 2022-11-15 PROCEDURE — 99214 OFFICE O/P EST MOD 30 MIN: CPT | Performed by: STUDENT IN AN ORGANIZED HEALTH CARE EDUCATION/TRAINING PROGRAM

## 2022-12-15 ENCOUNTER — OFFICE VISIT (OUTPATIENT)
Dept: INTERNAL MEDICINE CLINIC | Facility: CLINIC | Age: 32
End: 2022-12-15
Payer: COMMERCIAL

## 2022-12-15 VITALS
SYSTOLIC BLOOD PRESSURE: 116 MMHG | TEMPERATURE: 97 F | HEART RATE: 90 BPM | RESPIRATION RATE: 16 BRPM | OXYGEN SATURATION: 99 % | DIASTOLIC BLOOD PRESSURE: 83 MMHG | HEIGHT: 68.5 IN | WEIGHT: 293 LBS | BODY MASS INDEX: 43.9 KG/M2

## 2022-12-15 DIAGNOSIS — E05.00 GRAVES DISEASE: ICD-10-CM

## 2022-12-15 DIAGNOSIS — E66.01 MORBID OBESITY WITH BMI OF 45.0-49.9, ADULT (HCC): Primary | ICD-10-CM

## 2022-12-15 DIAGNOSIS — F41.9 ANXIETY: ICD-10-CM

## 2022-12-15 PROCEDURE — 99213 OFFICE O/P EST LOW 20 MIN: CPT | Performed by: INTERNAL MEDICINE

## 2022-12-15 PROCEDURE — 3079F DIAST BP 80-89 MM HG: CPT | Performed by: INTERNAL MEDICINE

## 2022-12-15 PROCEDURE — 3074F SYST BP LT 130 MM HG: CPT | Performed by: INTERNAL MEDICINE

## 2022-12-15 PROCEDURE — 3008F BODY MASS INDEX DOCD: CPT | Performed by: INTERNAL MEDICINE

## 2022-12-15 RX ORDER — SEMAGLUTIDE 1.34 MG/ML
INJECTION, SOLUTION SUBCUTANEOUS
Qty: 1.5 ML | Refills: 0 | Status: CANCELLED | OUTPATIENT
Start: 2022-12-15 | End: 2023-01-26

## 2022-12-15 RX ORDER — SEMAGLUTIDE 1.34 MG/ML
0.5 INJECTION, SOLUTION SUBCUTANEOUS
Qty: 15 ML | Refills: 0 | Status: SHIPPED | OUTPATIENT
Start: 2022-12-15 | End: 2022-12-16

## 2022-12-15 RX ORDER — PEN NEEDLE, DIABETIC 30 GX3/16"
1 NEEDLE, DISPOSABLE MISCELLANEOUS
Qty: 30 EACH | Refills: 0 | Status: SHIPPED | OUTPATIENT
Start: 2022-12-15

## 2022-12-15 NOTE — TELEPHONE ENCOUNTER
Please review patient's message below. Also, please review sig and update if needed. 90-day supply is 4.5 mL if patient is taking 0.5 mg weekly.

## 2022-12-16 RX ORDER — SEMAGLUTIDE 1.34 MG/ML
INJECTION, SOLUTION SUBCUTANEOUS
Qty: 4.5 ML | Refills: 0 | Status: SHIPPED | OUTPATIENT
Start: 2022-12-16

## 2022-12-19 ENCOUNTER — PATIENT MESSAGE (OUTPATIENT)
Dept: INTERNAL MEDICINE CLINIC | Facility: CLINIC | Age: 32
End: 2022-12-19

## 2022-12-19 NOTE — TELEPHONE ENCOUNTER
From: Kishan Sapp  To: [facility], DO  Sent: 12/19/2022 11:50 AM CST  Subject: Ozempic PreAuth    Hello,  I am reaching out because my insurance will not cover the ozempic without a preauth. My pharmacy said that I should ask you about completing one. Please let me know if there is anything else I need to do on my end. Thank you!

## 2022-12-20 RX ORDER — METHIMAZOLE 10 MG/1
5 TABLET ORAL DAILY
Qty: 90 TABLET | Refills: 0 | Status: SHIPPED | OUTPATIENT
Start: 2022-12-20

## 2022-12-22 ENCOUNTER — PATIENT MESSAGE (OUTPATIENT)
Dept: INTERNAL MEDICINE CLINIC | Facility: CLINIC | Age: 32
End: 2022-12-22

## 2022-12-22 DIAGNOSIS — E66.01 MORBID OBESITY WITH BMI OF 45.0-49.9, ADULT (HCC): Primary | ICD-10-CM

## 2022-12-22 NOTE — TELEPHONE ENCOUNTER
Pt walked into the office and asked for the sample. All internal medicine doctors have left for the day. Called Dr. Balwinder Walters on her cell - obtained verbal order - ok to give pt Ozempic sample pen. Gave pt to the pt in the lobby and documented sample .

## 2022-12-22 NOTE — TELEPHONE ENCOUNTER
DV - pt needs a sample, we have one here. OK for pt to  today? Lives in [place].  Please advise, ty

## 2022-12-22 NOTE — TELEPHONE ENCOUNTER
From: Marcial Diaz  Sent: 12/22/2022 2:57 PM CST  To: [facility] Clinical Staff  Subject: Manjo Knight,    I would be able to make it there before 4. I was supposed to take my dose last Thursday (12-15).

## 2022-12-28 ENCOUNTER — PATIENT MESSAGE (OUTPATIENT)
Dept: INTERNAL MEDICINE CLINIC | Facility: CLINIC | Age: 32
End: 2022-12-28

## 2022-12-28 NOTE — TELEPHONE ENCOUNTER
From: Jersey Carrasco  Sent: 12/28/2022 4:53 PM CST  To: [facility] Clinical Staff  Subject: Maximiliano Knight. My preauth was denied for this medication. I was wondering if there is an alternative or something else I can do to get it approved.

## 2022-12-29 ENCOUNTER — TELEPHONE (OUTPATIENT)
Dept: INTERNAL MEDICINE CLINIC | Facility: CLINIC | Age: 32
End: 2022-12-29

## 2022-12-29 NOTE — TELEPHONE ENCOUNTER
Pt scheduled VV to discuss new medication.     Ok to keep or would you like to see pt in the office     Future Appointments   Date Time Provider Riddhi Wheeler   1/12/2023  1:00 PM Ana Perez DO [facility] [place]

## 2023-01-03 ENCOUNTER — TELEPHONE (OUTPATIENT)
Dept: INTERNAL MEDICINE CLINIC | Facility: CLINIC | Age: 33
End: 2023-01-03

## 2023-01-03 NOTE — TELEPHONE ENCOUNTER
Incoming fax from [address] to start a PA for Ozempic.  Placed in [address] in basket for review

## 2023-01-09 NOTE — TELEPHONE ENCOUNTER
Prior authorization for [facility] is DENIED. Confirmation placed in Dr. Tera Wilson in-box.

## 2023-01-19 ENCOUNTER — TELEMEDICINE (OUTPATIENT)
Dept: INTERNAL MEDICINE CLINIC | Facility: CLINIC | Age: 33
End: 2023-01-19
Payer: COMMERCIAL

## 2023-01-19 DIAGNOSIS — E66.01 MORBID OBESITY WITH BMI OF 45.0-49.9, ADULT (HCC): Primary | ICD-10-CM

## 2023-01-19 PROCEDURE — 99213 OFFICE O/P EST LOW 20 MIN: CPT | Performed by: INTERNAL MEDICINE

## 2023-01-19 RX ORDER — TOPIRAMATE 50 MG/1
50 TABLET, FILM COATED ORAL DAILY
Qty: 90 TABLET | Refills: 0 | Status: SHIPPED | OUTPATIENT
Start: 2023-01-19 | End: 2023-04-19

## 2023-01-19 NOTE — PROGRESS NOTES
Virtual Telephone Check-In    This visit is conducted using Telemedicine with live, interactive video and audio. Patient resides in [place]   Patient understands and accepts financial responsibility for any deductible, co-insurance and/or co-pays associated with this service. Telehealth outside of [employer] Verbal Consent   I conducted a telehealth visit with MAXINE GUITÉRREZ on 1/19/2023   which was completed using two-way, real-time interactive audio and video  communication. This has been done in good pam to provide continuity of care in the best interest of the provider-patient relationship, due to the COVID -19 public health crisis/national emergency where restrictions of face-to-face office visits are ongoing. Every conscious effort was taken to allow for sufficient and adequate time to complete the visit. The patient was made aware of the limitations of the telehealth visit, including treatment limitations as no physical exam could be performed. The patient was advised to call 911 or to go to the ER in case there was an emergency. The patient was also advised of the potential privacy & security concerns related to the telehealth platform. The patient was made aware of where to find [facility] notice of privacy practices, telehealth consent form and other related consent forms and documents. which are located on the [facility] website. The patient verbally agreed to telehealth consent form, related consents and the risks discussed. Lastly, the patient confirmed that they were in [place]. Included in this visit, time may have been spent reviewing labs, medications, radiology tests and decision making. Appropriate medical decision-making and tests are ordered as detailed in the plan of care above. Coding/billing information is submitted for this visit based on complexity of care and/or time spent for the visit.   Time spent:6 minutes   HPI: Long Aldridge is a 28year old female who is calling about a weight loss medication. ozempic was not approved by insurance. Okay with trying an oral option   ROS:  General: Feels well overall  Skin: Denies any unusual skin lesions  Eyes: Denies blurred vision or double vision  All systems negative, except for above  Physical:  GENERAL: Alert and oriented, well developed, well nourished,in no apparent distress  SKIN: no rashes,no suspicious lesions on face  LUNGS: no audible wheezing  PSYCH: pleasant, appropriate mood and affect    Assessment and Plan  (E66.01,  Z68.42) Morbid obesity with BMI of 45.0-49.9, adult ([facility] [address]  (primary encounter diagnosis)  Plan: topiramate (TOPAMAX) 50 MG Oral Tab  Note: will start with medication above. May take up to 3 months to see an affect. Discussed the side effects.  Not planning on pregnancy at this moment    RTC In 6 weeks  32 [facility]

## 2023-01-24 ENCOUNTER — PATIENT MESSAGE (OUTPATIENT)
Facility: CLINIC | Age: 33
End: 2023-01-24

## 2023-01-24 NOTE — TELEPHONE ENCOUNTER
Per 11/15/22 OV notes: \"TFTs in ~ 6 weeks\"    TSH, T4, T3 are in system. MyChart response sent to patient. Will close this encounter.

## 2023-01-24 NOTE — TELEPHONE ENCOUNTER
From: Gustavo Lebron  To: Ryan Liz MD  Sent: 1/24/2023 1:02 PM CST  Subject: Blood Work    Hello! I was supposed to get blood work done after the first of the year. Long story short I was waiting for insurance coverage, which I now have (it is remaining [employer] PPO). I was wondering if the test request was still in the system or if you could resubmit it. I appreciate your help!

## 2023-01-27 ENCOUNTER — LAB ENCOUNTER (OUTPATIENT)
Dept: LAB | Age: 33
End: 2023-01-27
Attending: STUDENT IN AN ORGANIZED HEALTH CARE EDUCATION/TRAINING PROGRAM
Payer: COMMERCIAL

## 2023-01-27 DIAGNOSIS — E05.00 GRAVES DISEASE: ICD-10-CM

## 2023-01-27 LAB
T3 SERPL-MCNC: 84 NG/DL (ref 60–181)
T4 FREE SERPL-MCNC: 0.9 NG/DL (ref 0.8–1.7)
TSI SER-ACNC: 3.47 MIU/ML (ref 0.36–3.74)

## 2023-01-27 PROCEDURE — 84480 ASSAY TRIIODOTHYRONINE (T3): CPT | Performed by: STUDENT IN AN ORGANIZED HEALTH CARE EDUCATION/TRAINING PROGRAM

## 2023-01-27 PROCEDURE — 84443 ASSAY THYROID STIM HORMONE: CPT | Performed by: STUDENT IN AN ORGANIZED HEALTH CARE EDUCATION/TRAINING PROGRAM

## 2023-01-27 PROCEDURE — 84439 ASSAY OF FREE THYROXINE: CPT | Performed by: STUDENT IN AN ORGANIZED HEALTH CARE EDUCATION/TRAINING PROGRAM

## 2023-02-02 NOTE — TELEPHONE ENCOUNTER
Please clarify if patient taking this as last endocrinology notes state she can trial being off of med. If still taking then ok to send refill.

## 2023-02-03 RX ORDER — ATENOLOL 25 MG/1
25 TABLET ORAL DAILY
Qty: 90 TABLET | Refills: 0 | Status: SHIPPED | OUTPATIENT
Start: 2023-02-03

## 2023-02-14 ENCOUNTER — TELEMEDICINE (OUTPATIENT)
Facility: CLINIC | Age: 33
End: 2023-02-14
Payer: COMMERCIAL

## 2023-02-14 DIAGNOSIS — E05.00 GRAVES DISEASE: Primary | ICD-10-CM

## 2023-02-14 PROCEDURE — 99213 OFFICE O/P EST LOW 20 MIN: CPT | Performed by: STUDENT IN AN ORGANIZED HEALTH CARE EDUCATION/TRAINING PROGRAM

## 2023-02-14 RX ORDER — METHIMAZOLE 5 MG/1
2.5 TABLET ORAL DAILY
Qty: 90 TABLET | Refills: 0 | Status: SHIPPED | OUTPATIENT
Start: 2023-02-14

## 2023-04-06 ENCOUNTER — PATIENT MESSAGE (OUTPATIENT)
Facility: CLINIC | Age: 33
End: 2023-04-06

## 2023-04-06 ENCOUNTER — LAB ENCOUNTER (OUTPATIENT)
Dept: LAB | Age: 33
End: 2023-04-06
Attending: STUDENT IN AN ORGANIZED HEALTH CARE EDUCATION/TRAINING PROGRAM
Payer: COMMERCIAL

## 2023-04-06 DIAGNOSIS — E05.00 GRAVES DISEASE: ICD-10-CM

## 2023-04-06 LAB
T3 SERPL-MCNC: 66 NG/DL (ref 60–181)
T4 FREE SERPL-MCNC: 0.9 NG/DL (ref 0.8–1.7)
TSI SER-ACNC: 1.4 MIU/ML (ref 0.36–3.74)

## 2023-04-06 PROCEDURE — 36415 COLL VENOUS BLD VENIPUNCTURE: CPT

## 2023-04-06 PROCEDURE — 84445 ASSAY OF TSI GLOBULIN: CPT

## 2023-04-06 PROCEDURE — 84443 ASSAY THYROID STIM HORMONE: CPT

## 2023-04-06 PROCEDURE — 84439 ASSAY OF FREE THYROXINE: CPT

## 2023-04-06 PROCEDURE — 84480 ASSAY TRIIODOTHYRONINE (T3): CPT

## 2023-04-08 LAB — THY STIM IMMUNO: 0.54 IU/L

## 2023-04-17 DIAGNOSIS — E66.01 MORBID OBESITY WITH BMI OF 45.0-49.9, ADULT (HCC): ICD-10-CM

## 2023-04-17 RX ORDER — TOPIRAMATE 50 MG/1
TABLET, FILM COATED ORAL
Qty: 90 TABLET | Refills: 0 | Status: SHIPPED | OUTPATIENT
Start: 2023-04-17

## 2023-04-25 ENCOUNTER — PATIENT MESSAGE (OUTPATIENT)
Dept: INTERNAL MEDICINE CLINIC | Facility: CLINIC | Age: 33
End: 2023-04-25

## 2023-04-25 DIAGNOSIS — E66.01 MORBID OBESITY WITH BMI OF 45.0-49.9, ADULT (HCC): ICD-10-CM

## 2023-04-25 RX ORDER — TOPIRAMATE 50 MG/1
50 TABLET, FILM COATED ORAL DAILY
Qty: 90 TABLET | Refills: 0 | OUTPATIENT
Start: 2023-04-25

## 2023-04-25 NOTE — TELEPHONE ENCOUNTER
Called patient to inform her that her medication was refilled on 4/17/2023 and was sent to the [facility] in [place]. Patient stated that she is no longer using this pharmacy any more. She did stat that she will make the drive to go and  that medication but for us to change pharmacy to the [facility] in [address]

## 2023-05-18 ENCOUNTER — TELEMEDICINE (OUTPATIENT)
Facility: CLINIC | Age: 33
End: 2023-05-18
Payer: COMMERCIAL

## 2023-05-18 DIAGNOSIS — E05.00 GRAVES DISEASE: Primary | ICD-10-CM

## 2023-05-18 PROCEDURE — 99212 OFFICE O/P EST SF 10 MIN: CPT | Performed by: STUDENT IN AN ORGANIZED HEALTH CARE EDUCATION/TRAINING PROGRAM

## 2023-06-22 ENCOUNTER — TELEPHONE (OUTPATIENT)
Dept: INTERNAL MEDICINE CLINIC | Facility: CLINIC | Age: 33
End: 2023-06-22

## 2023-06-22 DIAGNOSIS — E66.01 MORBID OBESITY WITH BMI OF 45.0-49.9, ADULT (HCC): ICD-10-CM

## 2023-06-22 NOTE — TELEPHONE ENCOUNTER
Refill request.    TOPIRAMATE 50 MG Oral Tab      Future Appointments   Date Time Provider Riddhi Summer   7/3/2023  5:30 PM Sandy Bryant DO [facility] [address] AT [id number] [place] [address], [phone], [phone]   [address] [facility] [address]   Phone: [phone] Fax: [phone]

## 2023-06-23 RX ORDER — TOPIRAMATE 50 MG/1
50 TABLET, FILM COATED ORAL DAILY
Qty: 90 TABLET | Refills: 0 | Status: SHIPPED | OUTPATIENT
Start: 2023-06-23

## 2023-06-23 NOTE — TELEPHONE ENCOUNTER
Protocol : none    Requesting: Topiramate 50 MG    LOV: 12/15/22  RTC: None   Filled: 04/17/23 90 tablet no refill  Recent Labs: 04/25/23    Upcoming OV 07/03/23

## 2023-07-03 ENCOUNTER — OFFICE VISIT (OUTPATIENT)
Dept: INTERNAL MEDICINE CLINIC | Facility: CLINIC | Age: 33
End: 2023-07-03
Payer: COMMERCIAL

## 2023-07-03 VITALS
HEIGHT: 68 IN | WEIGHT: 275.19 LBS | BODY MASS INDEX: 41.71 KG/M2 | SYSTOLIC BLOOD PRESSURE: 122 MMHG | RESPIRATION RATE: 16 BRPM | DIASTOLIC BLOOD PRESSURE: 84 MMHG | TEMPERATURE: 98 F | HEART RATE: 86 BPM

## 2023-07-03 DIAGNOSIS — E66.01 MORBID OBESITY WITH BMI OF 45.0-49.9, ADULT (HCC): ICD-10-CM

## 2023-07-03 PROBLEM — E05.90 HYPERTHYROIDISM: Status: RESOLVED | Noted: 2022-06-30 | Resolved: 2023-07-03

## 2023-07-03 PROCEDURE — 3074F SYST BP LT 130 MM HG: CPT | Performed by: INTERNAL MEDICINE

## 2023-07-03 PROCEDURE — 3008F BODY MASS INDEX DOCD: CPT | Performed by: INTERNAL MEDICINE

## 2023-07-03 PROCEDURE — 99213 OFFICE O/P EST LOW 20 MIN: CPT | Performed by: INTERNAL MEDICINE

## 2023-07-03 PROCEDURE — 3079F DIAST BP 80-89 MM HG: CPT | Performed by: INTERNAL MEDICINE

## 2023-07-03 RX ORDER — TOPIRAMATE 50 MG/1
50 TABLET, FILM COATED ORAL DAILY
Qty: 90 TABLET | Refills: 0 | Status: SHIPPED | OUTPATIENT
Start: 2023-07-03

## 2023-07-03 RX ORDER — BUSPIRONE HYDROCHLORIDE 10 MG/1
10 TABLET ORAL NIGHTLY
COMMUNITY
Start: 2023-06-12

## 2023-07-03 RX ORDER — LORAZEPAM 0.5 MG/1
0.5 TABLET ORAL
COMMUNITY
Start: 2023-02-28

## 2023-07-03 RX ORDER — ESCITALOPRAM OXALATE 10 MG/1
10 TABLET ORAL EVERY MORNING
COMMUNITY

## 2023-07-11 ENCOUNTER — PATIENT MESSAGE (OUTPATIENT)
Facility: CLINIC | Age: 33
End: 2023-07-11

## 2023-07-17 ENCOUNTER — LABORATORY ENCOUNTER (OUTPATIENT)
Dept: LAB | Age: 33
End: 2023-07-17
Attending: INTERNAL MEDICINE
Payer: COMMERCIAL

## 2023-07-17 DIAGNOSIS — E66.01 MORBID OBESITY WITH BMI OF 45.0-49.9, ADULT (HCC): ICD-10-CM

## 2023-07-17 DIAGNOSIS — E05.00 GRAVES DISEASE: ICD-10-CM

## 2023-07-17 LAB
ALBUMIN SERPL-MCNC: 3.9 G/DL (ref 3.4–5)
ALBUMIN/GLOB SERPL: 1 {RATIO} (ref 1–2)
ALP LIVER SERPL-CCNC: 108 U/L
ALT SERPL-CCNC: 24 U/L
ANION GAP SERPL CALC-SCNC: 8 MMOL/L (ref 0–18)
AST SERPL-CCNC: 15 U/L (ref 15–37)
BASOPHILS # BLD AUTO: 0.05 X10(3) UL (ref 0–0.2)
BASOPHILS NFR BLD AUTO: 0.5 %
BILIRUB SERPL-MCNC: 0.6 MG/DL (ref 0.1–2)
BUN BLD-MCNC: 8 MG/DL (ref 7–18)
CALCIUM BLD-MCNC: 9.6 MG/DL (ref 8.5–10.1)
CHLORIDE SERPL-SCNC: 105 MMOL/L (ref 98–112)
CO2 SERPL-SCNC: 23 MMOL/L (ref 21–32)
CREAT BLD-MCNC: 1.13 MG/DL
EOSINOPHIL # BLD AUTO: 0.08 X10(3) UL (ref 0–0.7)
EOSINOPHIL NFR BLD AUTO: 0.8 %
ERYTHROCYTE [DISTWIDTH] IN BLOOD BY AUTOMATED COUNT: 13.2 %
FASTING STATUS PATIENT QL REPORTED: NO
GFR SERPLBLD BASED ON 1.73 SQ M-ARVRAT: 66 ML/MIN/1.73M2 (ref 60–?)
GLOBULIN PLAS-MCNC: 3.8 G/DL (ref 2.8–4.4)
GLUCOSE BLD-MCNC: 92 MG/DL (ref 70–99)
HCT VFR BLD AUTO: 45 %
HGB BLD-MCNC: 14.3 G/DL
IMM GRANULOCYTES # BLD AUTO: 0.04 X10(3) UL (ref 0–1)
IMM GRANULOCYTES NFR BLD: 0.4 %
LYMPHOCYTES # BLD AUTO: 2.87 X10(3) UL (ref 1–4)
LYMPHOCYTES NFR BLD AUTO: 27.7 %
MCH RBC QN AUTO: 28.3 PG (ref 26–34)
MCHC RBC AUTO-ENTMCNC: 31.8 G/DL (ref 31–37)
MCV RBC AUTO: 89.1 FL
MONOCYTES # BLD AUTO: 0.53 X10(3) UL (ref 0.1–1)
MONOCYTES NFR BLD AUTO: 5.1 %
NEUTROPHILS # BLD AUTO: 6.78 X10 (3) UL (ref 1.5–7.7)
NEUTROPHILS # BLD AUTO: 6.78 X10(3) UL (ref 1.5–7.7)
NEUTROPHILS NFR BLD AUTO: 65.5 %
OSMOLALITY SERPL CALC.SUM OF ELEC: 280 MOSM/KG (ref 275–295)
PLATELET # BLD AUTO: 162 10(3)UL (ref 150–450)
POTASSIUM SERPL-SCNC: 3.5 MMOL/L (ref 3.5–5.1)
PROT SERPL-MCNC: 7.7 G/DL (ref 6.4–8.2)
RBC # BLD AUTO: 5.05 X10(6)UL
SODIUM SERPL-SCNC: 136 MMOL/L (ref 136–145)
T3 SERPL-MCNC: 73 NG/DL (ref 60–181)
T4 FREE SERPL-MCNC: 1.1 NG/DL (ref 0.8–1.7)
TSI SER-ACNC: 0.77 MIU/ML (ref 0.36–3.74)
WBC # BLD AUTO: 10.4 X10(3) UL (ref 4–11)

## 2023-07-17 PROCEDURE — 84480 ASSAY TRIIODOTHYRONINE (T3): CPT | Performed by: STUDENT IN AN ORGANIZED HEALTH CARE EDUCATION/TRAINING PROGRAM

## 2023-07-17 PROCEDURE — 84439 ASSAY OF FREE THYROXINE: CPT | Performed by: STUDENT IN AN ORGANIZED HEALTH CARE EDUCATION/TRAINING PROGRAM

## 2023-07-17 PROCEDURE — 80050 GENERAL HEALTH PANEL: CPT | Performed by: INTERNAL MEDICINE

## 2023-07-18 DIAGNOSIS — N17.9 AKI (ACUTE KIDNEY INJURY) (HCC): Primary | ICD-10-CM

## 2023-07-29 DIAGNOSIS — E66.01 MORBID OBESITY WITH BMI OF 45.0-49.9, ADULT (HCC): ICD-10-CM

## 2023-07-31 RX ORDER — TOPIRAMATE 50 MG/1
50 TABLET, FILM COATED ORAL DAILY
Qty: 90 TABLET | Refills: 0 | Status: SHIPPED | OUTPATIENT
Start: 2023-07-31

## 2023-07-31 NOTE — TELEPHONE ENCOUNTER
Protocol NONE    Requesting: topiramate 50 MG Oral Tab     LOV: 7/3/23  RTC: 3 months   Filled: 7/3/23 90 tablet 0 refill   Recent Labs: 7/17/23    Upcoming OV   Future Appointments   Date Time Provider Riddhi Wheeler   8/17/2023  4:00 PM [facility] [place]   10/4/2023  4:00 PM Abelardo Perez DO [facility] [place]   11/13/2023  3:00 PM Jaz Jones MD [facility] [place]

## 2023-08-17 ENCOUNTER — LAB ENCOUNTER (OUTPATIENT)
Dept: LAB | Age: 33
End: 2023-08-17
Attending: INTERNAL MEDICINE
Payer: COMMERCIAL

## 2023-08-17 DIAGNOSIS — N17.9 AKI (ACUTE KIDNEY INJURY) (HCC): ICD-10-CM

## 2023-08-17 LAB
ANION GAP SERPL CALC-SCNC: 7 MMOL/L (ref 0–18)
BUN BLD-MCNC: 15 MG/DL (ref 7–18)
CALCIUM BLD-MCNC: 9.3 MG/DL (ref 8.5–10.1)
CHLORIDE SERPL-SCNC: 106 MMOL/L (ref 98–112)
CO2 SERPL-SCNC: 24 MMOL/L (ref 21–32)
CREAT BLD-MCNC: 1.06 MG/DL
EGFRCR SERPLBLD CKD-EPI 2021: 71 ML/MIN/1.73M2 (ref 60–?)
FASTING STATUS PATIENT QL REPORTED: NO
GLUCOSE BLD-MCNC: 81 MG/DL (ref 70–99)
OSMOLALITY SERPL CALC.SUM OF ELEC: 284 MOSM/KG (ref 275–295)
POTASSIUM SERPL-SCNC: 3.7 MMOL/L (ref 3.5–5.1)
SODIUM SERPL-SCNC: 137 MMOL/L (ref 136–145)

## 2023-08-17 PROCEDURE — 36415 COLL VENOUS BLD VENIPUNCTURE: CPT

## 2023-08-17 PROCEDURE — 80048 BASIC METABOLIC PNL TOTAL CA: CPT

## 2023-10-04 ENCOUNTER — TELEMEDICINE (OUTPATIENT)
Dept: INTERNAL MEDICINE CLINIC | Facility: CLINIC | Age: 33
End: 2023-10-04
Payer: COMMERCIAL

## 2023-10-04 DIAGNOSIS — E66.01 MORBID OBESITY WITH BMI OF 45.0-49.9, ADULT (HCC): ICD-10-CM

## 2023-10-04 PROCEDURE — 99213 OFFICE O/P EST LOW 20 MIN: CPT | Performed by: INTERNAL MEDICINE

## 2023-10-04 RX ORDER — TOPIRAMATE 50 MG/1
50 TABLET, FILM COATED ORAL DAILY
Qty: 90 TABLET | Refills: 0 | Status: SHIPPED | OUTPATIENT
Start: 2023-10-04

## 2023-10-04 NOTE — PROGRESS NOTES
Virtual Telephone Check-In    This visit is conducted using Telemedicine with live, interactive video and audio. Patient resides in [place]   Patient understands and accepts financial responsibility for any deductible, co-insurance and/or co-pays associated with this service. Telehealth outside of [employer] Verbal Consent   I conducted a telehealth visit with MAXINE GUTIÉRREZ on 10/4/2023   which was completed using two-way, real-time interactive audio and video  communication. This has been done in good pam to provide continuity of care in the best interest of the provider-patient relationship, due to the COVID -19 public health crisis/national emergency where restrictions of face-to-face office visits are ongoing. Every conscious effort was taken to allow for sufficient and adequate time to complete the visit. The patient was made aware of the limitations of the telehealth visit, including treatment limitations as no physical exam could be performed. The patient was advised to call 911 or to go to the ER in case there was an emergency. The patient was also advised of the potential privacy & security concerns related to the telehealth platform. The patient was made aware of where to find [facility] notice of privacy practices, telehealth consent form and other related consent forms and documents. which are located on the [facility] website. The patient verbally agreed to telehealth consent form, related consents and the risks discussed. Lastly, the patient confirmed that they were in [place]. Included in this visit, time may have been spent reviewing labs, medications, radiology tests and decision making. Appropriate medical decision-making and tests are ordered as detailed in the plan of care above. Coding/billing information is submitted for this visit based on complexity of care and/or time spent for the visit.   Time spent: 6 minutes  HPI: Gregoria Herbert is a 40-year-old female who presents for a weight check. She does not have a weighing scale but has noticed improvement in her weight and decrease in breast size. Tolerating the medication well. This video visit was requested by the provider  ROS:  General: Feels well overall  Skin: Denies any unusual skin lesions  Eyes: Denies blurred vision or double vision  All systems negative, except for above  Physical:  GENERAL: Alert and oriented, well developed, well nourished,in no apparent distress  SKIN: no rashes,no suspicious lesions on face  LUNGS: no audible wheezing  PSYCH: pleasant, appropriate mood and affect    Assessment and Plan  (E66.01,  Z68.42) Morbid obesity with BMI of 45.0-49.9, adult (HCC)  Plan: topiramate 50 MG Oral Tab  Note: Continue with diet and lifestyle changes.   Will reorder medication and follow-up with patient in 3 months    RTC in 3 months  [address] [place] [address]

## 2023-10-11 ENCOUNTER — TELEPHONE (OUTPATIENT)
Dept: INTERNAL MEDICINE CLINIC | Facility: CLINIC | Age: 33
End: 2023-10-11

## 2023-10-11 NOTE — TELEPHONE ENCOUNTER
SV - okay to make the changes below to the patient's medication list? Please advise, ty! Patient clinical update received through "[employer]:    Problems   The patient or proxy has not reviewed this information. Medications   The patient or proxy has not reviewed this information, and there are updates pending:   Requested Medication Additions Start Date Reported By  Comments   Vilazodone HCl 10 & 20 MG Kit  Pablo Carrera Core Ram 20 mg     Requested Medication Removals Start Date Reported By  Comments   busPIRone 10 MG Tabs 6/12/2023 Pablo Mesa    escitalopram 10 MG Tabs  Pablo Mesa      Allergies   The patient or proxy has not reviewed this information.

## 2023-10-19 ENCOUNTER — PATIENT MESSAGE (OUTPATIENT)
Dept: INTERNAL MEDICINE CLINIC | Facility: CLINIC | Age: 33
End: 2023-10-19

## 2023-10-19 DIAGNOSIS — E66.01 MORBID OBESITY WITH BMI OF 45.0-49.9, ADULT (HCC): ICD-10-CM

## 2023-10-19 RX ORDER — TOPIRAMATE 50 MG/1
50 TABLET, FILM COATED ORAL DAILY
Qty: 90 TABLET | Refills: 0 | Status: CANCELLED | OUTPATIENT
Start: 2023-10-19

## 2023-10-20 NOTE — TELEPHONE ENCOUNTER
From: Irena Nagel  To: Xochilt Maldonado  Sent: 10/19/2023 1:43 PM CDT  Subject: Referral Request    Hello,    I want to verify if I have a refill. The pharmacy and [employer] message said I do not, but I received a voice message that said I did.      Kind regards,    Yolanda Peacock

## 2023-10-23 ENCOUNTER — LAB ENCOUNTER (OUTPATIENT)
Dept: LAB | Age: 33
End: 2023-10-23
Attending: INTERNAL MEDICINE

## 2023-10-23 DIAGNOSIS — E05.00 GRAVES DISEASE: ICD-10-CM

## 2023-10-23 LAB
T3 SERPL-MCNC: 74 NG/DL (ref 60–181)
T4 FREE SERPL-MCNC: 0.9 NG/DL (ref 0.8–1.7)
TSI SER-ACNC: 0.47 MIU/ML (ref 0.36–3.74)

## 2023-10-23 PROCEDURE — 36415 COLL VENOUS BLD VENIPUNCTURE: CPT

## 2023-10-23 PROCEDURE — 84480 ASSAY TRIIODOTHYRONINE (T3): CPT

## 2023-10-23 PROCEDURE — 84443 ASSAY THYROID STIM HORMONE: CPT

## 2023-10-23 PROCEDURE — 84439 ASSAY OF FREE THYROXINE: CPT

## 2023-11-13 ENCOUNTER — PATIENT MESSAGE (OUTPATIENT)
Facility: CLINIC | Age: 33
End: 2023-11-13

## 2023-11-13 NOTE — TELEPHONE ENCOUNTER
From: Anil Leung  To: Christa Salazar  Sent: 11/13/2023 1:59 PM CST  Subject: 11-13 Appt    Hello! I received a message about Dr. Jim Travis needing to cancel my appointment for today at 3:00 pm. I was wondering what her availability looks like to reschedule. On Wednesday and Thursday this week I am free 3:30 or later or I am completely open on Monday, November 27th. Thank you!

## 2023-11-20 ENCOUNTER — TELEMEDICINE (OUTPATIENT)
Facility: CLINIC | Age: 33
End: 2023-11-20
Payer: COMMERCIAL

## 2023-11-20 DIAGNOSIS — E05.00 GRAVES DISEASE: Primary | ICD-10-CM

## 2023-11-20 NOTE — PROGRESS NOTES
Return Office Telemedicine Visit     CHIEF COMPLAINT:  No chief complaint on file. Date: 11/20/2023    Patient verbally consents to a Hope Pollen for this visit. Patient understands and accepts financial responsibility for any deductible, co-insurance and/or co-pays associated with this service.     HISTORY OF PRESENT ILLNESS:  Adrien Paulino is a 35year old female who presents for follow up for Graves    Initial consult HPI: Aug 2022  Sister has Hashimoto's and mother had toxic nodules s/p lobectomy  Saw PCP in May 2022 for outside TSH <0.01 and fT4 1.9 (was tired and anxious, but had other stressors)  Anti-TPO and anti-Tg ab both elevated  Atenolol 25mg for palptiations  Thyroid US in July 2022 showed a benign cystic nodule      Pt endorses: Abnormal menses - had been on provera x 10 yrs, just stopped in March 2022, periods have been irregular since then, palpitations  Ordered labs to determine if needs MMI    Interim hx:   Nov 2022 visit  8/2022 - (+)TSI c/w Graves, fT4 1.8, TSH entirely suppressed, total T3 170, started on MMI 10mg daily  9/2022 - fT4 1.1, TSH 0.006, total T3 101; lowered to MMI 5mg daily  11/2022 - fT4 0.9, TSH 0.648, total T3 94; lowered to MMI 2.5mg daily    Periods are getting more normal (for her baseline)  Family planning is on hold  Not having palpitations but that is on atenolol    Feb 2023 visit  1/2023 - fT4 0.9, TSH 3.47, total T3   Remains on MMI 2.5mg, feels well  Working on getting a permanent position at [employer], currently a  status  Lowered to [address] [place] 2.5mg every other day    May 2023 visit  4/2023 - fT4 0.9, TSH 1.4, TT3 66, (-) TSI -- on MMI 2.5mg every other day  Starting a new position in July, fortunately will be working less hours  Periods are now every 2 weeks; saw OB/Gyn, ruled out for PCOS, reviewed euthyroid labs, still getting worked up  [address] [place] entirely by this visit    Nov 2023  Been off [address] since last visit  Feels well  Stable weight and energy  7/2023 - fT4 1.1, TSH 0.77  10/2023 - fT4 0.9, TSH 0.46, TT3 74      CURRENT MEDICATION:    Current Outpatient Medications   Medication Sig Dispense Refill    Vilazodone HCl 10 & 20 MG Oral Kit 20 mg.      topiramate 50 MG Oral Tab Take 1 tablet (50 mg total) by mouth daily. 90 tablet 0         ALLERGY:  No Known Allergies      PAST MEDICAL, SOCIAL AND FAMILY HISTORY:  See past medical history marked as reviewed. See past surgical history marked as reviewed. See past family history marked as reviewed. See past social history marked as reviewed. REVIEW OF SYSTEMS:   Ten point review of systems has been performed and is otherwise negative and/or non-contributory, except as described above. PHYSICAL EXAM:   There were no vitals filed for this visit. BMI: There is no height or weight on file to calculate BMI. CONSTITUTIONAL:  awake, alert, cooperative, no apparent distress, and appears stated age  PSYCH: normal affect        DATA:     Pertinent data reviewed    US THYROID (NDC=20276)    Result Date: 7/13/2022  CONCLUSION:   Benign cystic nodule in the right thyroid lobe. TR1 - Benign (No FNA). Dictated by (CST): Jean Claude Bledsoe MD on 7/13/2022 at 12:10 PM     Finalized by (CST): Jean Claude Bledsoe MD on 7/13/2022 at 12:12 PM           ASSESSMENT AND PLAN:    (E05.00) Graves disease  (primary encounter diagnosis)  Plan: ASSAY, THYROID STIM HORMONE, FREE T4 (FREE         THYROXINE), TRIIODOTHYRONINE (T3) TOTAL,         TRIIODOTHYRONINE (T3) TOTAL, TSH+FREE T4  TFTs normalized after nearly 1 yr of MMI, now stopping in May 2023. Pt has been biochemically euthyroid on MMI 2.5mg every other day, TSI went from + to -.  Tsh slightly downtrending but T4 and T3 wnl, clinically euthyroid  - TFTs in 3 months and in 6 months  - sooner if sx    - Return to Clinic in in 6 months,        11/20/2023  Danielle Garcia MD

## 2024-01-26 NOTE — TELEPHONE ENCOUNTER
Last lab result note: Pt's 3 month TFTs look normal, has another one in 3 more months then f/u. Just FYI to pt that she will need f/u with diff provider in April, can book with Iesha or Xena coy, thank you!     Patient phoned back in- and verbalized understanding. Rescheduled with Iesha FORBES on 4/8 at 11:15    Pended labs and routed to Iesha FORBES for review.

## 2024-02-12 NOTE — TELEPHONE ENCOUNTER
Requesting    topiramate 50 MG Oral Tab         Sig: Take 1 tablet (50 mg total) by mouth daily.    Disp: 90 tablet    Refills: 0    Start: 2/11/2024    Class: Normal    Non-formulary For: Morbid obesity with BMI of 45.0-49.9, adult (HCC)    Last ordered: 4 months ago (10/4/2023) by Xochilt Farfan, DO    Neurology Medications [id number]/11/2024 10:18 PM   Protocol Details In person appointment or virtual visit in the past 6 mos or appointment in next 3 mos        LOV: 10/4/2023  RTC: 3 months   Last Relevant Labs: n/a   Filled: 10/4/2023 #90 with 0 refills    Future Appointments   Date Time Provider Department Center   4/3/2024  4:15 PM [facility]   4/8/2024 11:15 AM Pau Leonardo APN [facility]

## 2024-04-29 NOTE — PATIENT INSTRUCTIONS
Return in about 6 months (around 10/29/2024) for thyroid follow up, can be video visit.    - repeat labs in 3 mo (around 7/10/24)  - again before next visit (around 10/10/24)

## 2024-04-29 NOTE — PROGRESS NOTES
[facility]    Name: Viviana Covarrubias  Date: 4/29/2024      HISTORY OF PRESENT ILLNESS:  Viviana Covarrubias is a 33 year old female who presents for follow up for Graves    Initial consult HPI: Aug 2022  Sister has Hashimoto's and mother had toxic nodules s/p lobectomy  Saw PCP in May 2022 for outside TSH <0.01 and fT4 1.9 (was tired and anxious, but had other stressors)  Anti-TPO and anti-Tg ab both elevated  Atenolol 25mg for palptiations  Thyroid US in July 2022 showed a benign cystic nodule      Pt endorses: Abnormal menses - had been on provera x 10 yrs, just stopped in March 2022, periods have been irregular since then, palpitations  Ordered labs to determine if needs MMI    Interim hx:   Nov 2022 visit  8/2022 - (+)TSI c/w Graves, fT4 1.8, TSH entirely suppressed, total T3 170, started on MMI 10mg daily  9/2022 - fT4 1.1, TSH 0.006, total T3 101; lowered to MMI 5mg daily  11/2022 - fT4 0.9, TSH 0.648, total T3 94; lowered to MMI 2.5mg daily    Periods are getting more normal (for her baseline)  Family planning is on hold  Not having palpitations but that is on atenolol    Feb 2023 visit  1/2023 - fT4 0.9, TSH 3.47, total T3   Remains on MMI 2.5mg, feels well  Working on getting a permanent position at [employer], currently a  status  Lowered to MMI 2.5mg every other day    May 2023 visit  4/2023 - fT4 0.9, TSH 1.4, TT3 66, (-) TSI -- on MMI 2.5mg every other day  Starting a new position in July, fortunately will be working less hours  Periods are now every 2 weeks; saw OB/Gyn, ruled out for PCOS, reviewed euthyroid labs, still getting worked up  Stopped MMI entirely by this visit    Nov 2023  Been off MMI since last visit  Feels well  Stable weight and energy  7/2023 - fT4 1.1, TSH 0.77  10/2023 - fT4 0.9, TSH 0.46, TT3 74    April 2024- w/ endo APN, re: thyroid:  Been off MMI since Nov 2023  Feels well other than sometimes difficulty falling asleep,   Moved  recently  Stable weight and energy  1/2024 TSH 0.986, fT4 0.90 (off MMI)  4/2024 TSH 0.801, fT4 1.30    Objective:    CURRENT MEDICATION:    Current Outpatient Medications   Medication Sig Dispense Refill    LORazepam 0.5 MG Oral Tab Take 1 tablet (0.5 mg total) by mouth daily as needed.      topiramate 50 MG Oral Tab Take 1 tablet (50 mg total) by mouth daily. 90 tablet 0    Vilazodone HCl 10 & 20 MG Oral Kit 20 mg.           ALLERGY:  No Known Allergies      PAST MEDICAL, SOCIAL AND FAMILY HISTORY:  See past medical history marked as reviewed.  See past surgical history marked as reviewed.  See past family history marked as reviewed.  See past social history marked as reviewed.      REVIEW OF SYSTEMS:   Ten point review of systems has been performed and is otherwise negative and/or non-contributory, except as described above.    PHYSICAL EXAM:   Vitals:    04/29/24 1059   BP: 120/76   Pulse: 86   SpO2: 98%     BMI: There is no height or weight on file to calculate BMI.     CONSTITUTIONAL:  awake, alert, cooperative, no apparent distress, and appears stated age  PSYCH: normal affect      DATA:     Pertinent data reviewed    US THYROID (CPT=[id number])    Result Date: 7/13/2022  CONCLUSION:   Benign cystic nodule in the right thyroid lobe. TR1 - Benign (No FNA).      Dictated by (CST): Elio Nesbitt MD on 7/13/2022 at 12:10 PM     Finalized by (CST): Elio Nesbitt MD on 7/13/2022 at 12:12 PM       Assessment & Plan:    (E05.00) Graves disease  (primary encounter diagnosis)  Plan: ASSAY, THYROID STIM HORMONE, FREE T4 (FREE         THYROXINE), TRIIODOTHYRONINE (T3) TOTAL,         TRIIODOTHYRONINE (T3) TOTAL, TSH+FREE T4  33 year old female, presented 8/2022 - (+)TSI c/w Graves, fT4 1.8, TSH entirely suppressed initially. TFTs normalized after nearly 1 yr of MMI, now stopping in May 2023. Pt has been biochemically euthyroid in Nov 2023 on MMI 2.5mg every other day, TSI went from + to -.  Clinically euthyroid off of MMI for  6 mo. Free T4 mildly trending upwards but patient feeling well.    - TFTs in 3 months given mild uptrend in fT4, then in 6 months (July / Oct 2024)  - if stable, likely can space to q6mo in Oct  - sooner labs if sx, to contact office    Return in about 6 months (around 10/29/2024) for thyroid follow up, can be video visit. Prev Dr. Bonilla patient, can resume follow up upon return Jan 2025; for now will continue follow up with sunita FORBES    A total of 24 minutes was spent today on obtaining history, reviewing pertinent labs, evaluating patient, providing multiple treatment options, reinforcing diet/exercise and compliance, and completing documentation.     LEIDY Presley  4/29/2024

## 2024-05-22 PROBLEM — R74.8 ELEVATED LIVER ENZYMES: Status: RESOLVED | Noted: 2022-06-30 | Resolved: 2024-05-22

## 2024-05-22 NOTE — PROGRESS NOTES
Patient Office Visit    ASSESSMENT AND PLAN:   1. Routine physical examination  Note: Continue to exercise at least 150 minutes a week and Eat a plant based diet. Please take 2000 IU of vitamin D daily for life to keep your bones strong. Please see a dermatologist yearly for skin checks. Please see your dentist every 6 months. Continue with regular eye exams   - ALT(SGPT); Future  - AST (SGOT); Future  - Basic Metabolic Panel (8); Future  - Lipid Panel; Future  - CBC With Differential With Platelet; Future  - Hemoglobin A1C; Future    2. Morbid obesity with BMI of 45.0-49.9, adult (HCC)  Note: applauded on the weight loss. Start weight is 317 lbs. Will start phentermine and continue with topamax  - Phentermine HCl 37.5 MG Oral Tab; Take 1 tablet (37.5 mg total) by mouth every morning before breakfast.  Dispense: 30 tablet; Refill: 2  - Lipid Panel; Future  - Hemoglobin A1C; Future    3. Graves disease  Note: off medication and doing well     4. NAFLD (nonalcoholic fatty liver disease)  Note: will repeat labs. Continue with dietary changes     5. Anxiety  Note: continue vilazodone through therapist.     6. Skin cancer screening  - Derm Referral - External    RTC in 6 weeks       Patient/Caregiver Education: Patient/Caregiver Education: There are no barriers to learning. Medical education done. Outcome: Patient verbalizes understanding. Patient is notified to call with any questions, complications, allergies, or worsening or changing symptoms.  Patient is to call with any side effects or complications from the treatments as a result of today.      Reviewed Past Medical History and   Patient Active Problem List   Diagnosis    Contraceptive surveillance    Dyspareunia in female not due to substance or known physiological condition    Morbid obesity with BMI of 45.0-49.9, adult (HCC)    NAFLD (nonalcoholic fatty liver disease)    Graves disease       Orders Placed This Encounter   Procedures    ALT(SGPT)     Standing  Status:   Future     Standing Expiration Date:   5/22/2025    AST (SGOT)     Standing Status:   Future     Standing Expiration Date:   5/22/2025    Basic Metabolic Panel (8)     Standing Status:   Future     Standing Expiration Date:   5/22/2025    Lipid Panel     Standing Status:   Future     Standing Expiration Date:   5/22/2025    CBC With Differential With Platelet     Standing Status:   Future     Standing Expiration Date:   5/22/2025    Hemoglobin A1C     Standing Status:   Future     Standing Expiration Date:   5/22/2025     Requested Prescriptions     Signed Prescriptions Disp Refills    Phentermine HCl 37.5 MG Oral Tab 30 tablet 2     Sig: Take 1 tablet (37.5 mg total) by mouth every morning before breakfast.         Xochilt Farfan DO  CC:  Chief Complaint   Patient presents with    CPX         HPI:   Viviana Covarrubias is a 33 year old female who presents for a physical    Obesity: doing well on topamax, but would like to add phentermine to see if that will help further with weight loss  Anxiety: doing well on vilazodone and is planning on weaning off this in the future  NAFLD: continue to work on diet and lifestyle changes     Past Medical History:    Allergic rhinitis    Anxiety    Depression    Graves disease    Obesity    Ovarian cyst    Pap smear for cervical cancer screening    Negative    PTSD (post-traumatic stress disorder)    using cannabis oil daily per medical card prescription       Past Surgical History:   Procedure Laterality Date    Other surgical history  05/23/2018, 4/2022    left then right surgery for retinal issue    Other surgical history  04/2022    PRK eye surgery       Social History:  Social History     Socioeconomic History    Marital status: Single   Tobacco Use    Smoking status: Never    Smokeless tobacco: Never   Vaping Use    Vaping status: Never Used   Substance and Sexual Activity    Alcohol use: Yes     Comment: occ    Drug use: Yes     Types: Cannabis     Comment:  edibles once a month    Sexual activity: Yes   Other Topics Concern    Caffeine Concern Yes     Comment: 8 oz of coffee daily or every other day.    Exercise No    Seat Belt Yes     Social Determinants of Health      [facility]    Housing Stability     Family History:  Family History   Problem Relation Age of Onset    Diabetes Father     Hypertension Father     Other (Other) Father         thyroid    Diabetes Mother     Other (Other) Mother         hashimoto's    Breast Cancer Maternal Grandmother         dx age 70's    Cancer Maternal Grandfather         stomach dx 70s    Cancer Paternal Grandmother         stomach dx age 50s    Heart Disorder Paternal Grandfather         arrhythmia    Ovarian Cancer Paternal Aunt         dx age 40's    Ovarian Cancer Paternal Cousin Female 32    No Known Problems Sister     No Known Problems Sister      Allergies:  No Known Allergies  Current Meds:  Current Outpatient Medications on File Prior to Visit   Medication Sig Dispense Refill    LORazepam 0.5 MG Oral Tab Take 1 tablet (0.5 mg total) by mouth daily as needed.      topiramate 50 MG Oral Tab Take 1 tablet (50 mg total) by mouth daily. 90 tablet 0    Vilazodone HCl 10 & 20 MG Oral Kit 20 mg.       No current facility-administered medications on file prior to visit.         REVIEW OF SYSTEMS   Constitutional: no fatigue normal energy no weight changes   HENT: normal sinuses and no mouth issues   Eyes: . normal vision no eye pain   Respiratory: normal respirations no cough   Cardiovascular: no CP, or palpitations   Gastrointestinal: normal bowels and no abd pains   Genitourinary:  normal urination no hematuria, no frequency   Musculoskeletal: no pains in arms/legs, normal range of motion   Skin: no rashes or skin lesions that are new   Neurological:  no weakness, no numbness, normal gait   Hematological:  no bruises or bleeding   Psychiatric/Behavioral: normal  mood no anxiety normal behavior     /70   Pulse 78   Temp 98 °F (36.7 °C)   Ht 5' 8\" (1.727 m)   Wt 266 lb (120.7 kg)   LMP 07/03/2023 (Exact Date)   SpO2 98%   BMI 40.45 kg/m²     PHYSICAL EXAM:   Constitutional: Vital signs reviewed as noted, well developed, in no acute distress.   HENT: NCAT, bilateral ear canal and tympanic membrane appear normal  Eyes: pupils reactive bilaterally  Neck: No thyroidmegaly  Cardiovascular: nl s1 s2 no m/r/g  Pulmonary/Chest: CTA bilaterally with no wheezes  Abdominal: Soft NT normal Bowel sounds  Extremities: no pedal edema   Neurological:  no weakness in UE and LE, reflexes are normal  Skin: no rashes or bruises on visualized skin, not undressed, lipoma noted on the right shoulder   Psychiatric:normal mood

## 2024-05-22 NOTE — PATIENT INSTRUCTIONS
Continue to exercise at least 150 minutes a week and Eat a plant based diet     Please take 2000 IU of vitamin D daily for life to keep your bones strong  Please see a dermatologist yearly for skin checks  Please see your dentist every 6 months  Continue with regular eye exams    Please have blood work done    I have referred you to the dermatologist    I have started phentermine    See me back in 6 weeks (video visit is okay)

## 2024-05-23 PROBLEM — N17.9 AKI (ACUTE KIDNEY INJURY) (HCC): Status: ACTIVE | Noted: 2024-05-23

## 2024-06-10 ENCOUNTER — HOSPITAL ENCOUNTER (OUTPATIENT)
Dept: ULTRASOUND IMAGING | Age: 34
Discharge: HOME | End: 2024-06-10
Attending: INTERNAL MEDICINE
Payer: COMMERCIAL

## 2024-06-10 DIAGNOSIS — N17.9 AKI (ACUTE KIDNEY INJURY) (HCC): ICD-10-CM

## 2024-06-10 PROCEDURE — 76775 US EXAM ABDO BACK WALL LIM: CPT | Performed by: INTERNAL MEDICINE

## 2024-07-01 NOTE — PROGRESS NOTES
Patient Office Visit    ASSESSMENT AND PLAN:   1. Morbid obesity with BMI of 45.0-49.9, adult (HCC)  Note: Start weight was 317 pounds.  Applauded on the weight loss.  Continue with Topamax and phentermine as she is tolerating it well.  Will continue it for another 12 weeks.    2. KATIE (acute kidney injury) (HCC)  Note: Urine test is pending and will repeat BMP in 3 months  - Basic Metabolic Panel (8) [E]; Future    Return to clinic in 3 months      Patient/Caregiver Education: Patient/Caregiver Education: There are no barriers to learning. Medical education done. Outcome: Patient verbalizes understanding. Patient is notified to call with any questions, complications, allergies, or worsening or changing symptoms.  Patient is to call with any side effects or complications from the treatments as a result of today.      Reviewed Past Medical History and   Patient Active Problem List   Diagnosis    Contraceptive surveillance    Dyspareunia in female not due to substance or known physiological condition    Morbid obesity with BMI of 45.0-49.9, adult (HCC)    NAFLD (nonalcoholic fatty liver disease)    Graves disease    KATIE (acute kidney injury) (HCC)       No orders of the defined types were placed in this encounter.    Requested Prescriptions      No prescriptions requested or ordered in this encounter         Xochilt Farfan DO  CC:  Chief Complaint   Patient presents with    Follow - Up         HPI:   Viviana Covarrubias a 34-year old female who presents for a weight check    Obesity: Is tolerating Topamax and phentermine fine.  She occasionally gets a fast heart rate but does not last too long and it is very sparingly.  She would like to continue both medicines    Past Medical History:    Allergic rhinitis    Anxiety    Depression    Graves disease    Obesity    Ovarian cyst    Pap smear for cervical cancer screening    Negative    PTSD (post-traumatic stress disorder)    using cannabis oil daily per medical card  prescription       Past Surgical History:   Procedure Laterality Date    Other surgical history  05/23/2018, 4/2022    left then right surgery for retinal issue    Other surgical history  04/2022    PRK eye surgery       Social History:  Social History     Socioeconomic History    Marital status: Single   Tobacco Use    Smoking status: Never    Smokeless tobacco: Never   Vaping Use    Vaping status: Never Used   Substance and Sexual Activity    Alcohol use: Yes     Comment: occ    Drug use: Yes     Types: Cannabis     Comment: edibles once a month    Sexual activity: Yes   Other Topics Concern    Caffeine Concern Yes     Comment: 8 oz of coffee daily or every other day.    Exercise No    Seat Belt Yes     Social Determinants of Health      Received from [facility], [facility]    Housing Stability     Family History:  Family History   Problem Relation Age of Onset    Diabetes Father     Hypertension Father     Other (Other) Father         thyroid    Diabetes Mother     Other (Other) Mother         hashimoto's    Breast Cancer Maternal Grandmother         dx age 70's    Cancer Maternal Grandfather         stomach dx 70s    Cancer Paternal Grandmother         stomach dx age 50s    Heart Disorder Paternal Grandfather         arrhythmia    Ovarian Cancer Paternal Aunt         dx age 40's    Ovarian Cancer Paternal Cousin Female 32    No Known Problems Sister     No Known Problems Sister      Allergies:  No Known Allergies  Current Meds:  Current Outpatient Medications on File Prior to Visit   Medication Sig Dispense Refill    vilazodone 20 MG Oral Tab Take 1 tablet (20 mg total) by mouth daily with breakfast.      Phentermine HCl 37.5 MG Oral Tab Take 1 tablet (37.5 mg total) by mouth every morning before breakfast. 30 tablet 2    LORazepam 0.5 MG Oral Tab Take 1 tablet (0.5 mg total) by mouth daily as needed.      topiramate 50 MG Oral Tab Take 1 tablet (50 mg total) by mouth  daily. 90 tablet 0     No current facility-administered medications on file prior to visit.         REVIEW OF SYSTEMS   Constitutional: no fatigue normal energy no weight changes   HENT: normal sinuses and no mouth issues   Eyes: . normal vision no eye pain   Respiratory: normal respirations no cough   Cardiovascular: no CP, or palpitations   Gastrointestinal: normal bowels and no abd pains   Genitourinary:  normal urination no hematuria, no frequency   Musculoskeletal: no pains in arms/legs, normal range of motion   Skin: no rashes or skin lesions that are new   Neurological:  no weakness, no numbness, normal gait   Hematological:  no bruises or bleeding   Psychiatric/Behavioral: normal mood no anxiety normal behavior     /76 (BP Location: Right arm, Patient Position: Sitting, Cuff Size: adult)   Pulse 84   Temp 97.6 °F (36.4 °C) (Temporal)   Resp 14   Ht 5' 8\" (1.727 m)   Wt 260 lb 12.8 oz (118.3 kg)   LMP 07/03/2023 (Exact Date)   SpO2 98%   BMI 39.65 kg/m²     PHYSICAL EXAM:   Constitutional: Vital signs reviewed as noted, well developed, in no acute distress.   HENT: NCAT  Eyes: pupils reactive bilaterally  Cardiovascular: nl s1 s2 no m/r/g  Pulmonary/Chest: CTA bilaterally with no wheezes  Extremities: no pedal edema   Neurological:  no weakness in UE and LE, reflexes are normal  Skin: no rashes or bruises on visualized skin, not undressed   Psychiatric:normal mood

## 2024-07-01 NOTE — PATIENT INSTRUCTIONS
Great job on the weight loss    Continue medications    Lets repeat a kidney blood test in 3 months when you see me and I will let you know what the urine test shows    Let me know if you have any palpitations

## 2024-07-15 NOTE — TELEPHONE ENCOUNTER
From: Viviana Covarrubias  To: Xochiltnii Farfan  Sent: 7/14/2024 11:52 AM CDT  Subject: Heart Rate    Hello,    I know we discussed checking in if there are changes with my heart rate. While I haven’t noticed any palpitations, my Apple Watch notified me that my walking heart rate increased over the last seven weeks, which is also the time line from when I started the new medication. Please let me know if you would like me to stop or tamper it.     Thank you.

## 2024-08-23 NOTE — TELEPHONE ENCOUNTER
Protocol FAIL    Requesting: Phentermine HCl 37.5 MG Oral Tab     LOV: 7/1/24  RTC:   Filled: 5/22/24 30 TABLET 2 REFILL  Recent Labs: 5/22/24    Upcoming OV   Future Appointments   Date Time Provider Department Center   9/23/2024  5:30 PM Xochilt Farfan DO [facility] [place]   10/25/2024  9:45 AM Pau Leonardo [facility] [place]

## 2024-09-23 NOTE — PROGRESS NOTES
Patient Office Visit    ASSESSMENT AND PLAN:   1. Morbid obesity with BMI of 45.0-49.9, adult (HCC)  Note: start weight was 315 lbs. Will continue regimen and add more exercise to the plan   - Phentermine HCl 37.5 MG Oral Tab; Take 1 tablet (37.5 mg total) by mouth every morning before breakfast.  Dispense: 30 tablet; Refill: 2  - topiramate 50 MG Oral Tab; Take 1 tablet (50 mg total) by mouth daily.  Dispense: 90 tablet; Refill: 0    RTC in 3 months       Patient/Caregiver Education: Patient/Caregiver Education: There are no barriers to learning. Medical education done. Outcome: Patient verbalizes understanding. Patient is notified to call with any questions, complications, allergies, or worsening or changing symptoms.  Patient is to call with any side effects or complications from the treatments as a result of today.      Reviewed Past Medical History and   Patient Active Problem List   Diagnosis    Contraceptive surveillance    Dyspareunia in female not due to substance or known physiological condition    Morbid obesity with BMI of 45.0-49.9, adult (HCC)    NAFLD (nonalcoholic fatty liver disease)    Graves disease    KATIE (acute kidney injury) (HCC)       No orders of the defined types were placed in this encounter.    Requested Prescriptions     Signed Prescriptions Disp Refills    Phentermine HCl 37.5 MG Oral Tab 30 tablet 2     Sig: Take 1 tablet (37.5 mg total) by mouth every morning before breakfast.    topiramate 50 MG Oral Tab 90 tablet 0     Sig: Take 1 tablet (50 mg total) by mouth daily.         Xochilt Farfan DO  CC:  Chief Complaint   Patient presents with    Weight Management     12-week follow-up         HPI:   Viviana Covarrubias is a 34 year old female who presents for a weight check. Her schedule has changed to 10 hours, 4 days a week so she is trying to incorporate exercise to her schedule. Tolerating the medications well     Past Medical History:    Allergic rhinitis    Anxiety     Depression    Graves disease    Obesity    Ovarian cyst    Pap smear for cervical cancer screening    Negative    PTSD (post-traumatic stress disorder)    using cannabis oil daily per medical card prescription       Past Surgical History:   Procedure Laterality Date    Other surgical history  05/23/2018, 4/2022    left then right surgery for retinal issue    Other surgical history  04/2022    PRK eye surgery       Social History:  Social History     Socioeconomic History    Marital status: Single   Tobacco Use    Smoking status: Never    Smokeless tobacco: Never   Vaping Use    Vaping status: Never Used   Substance and Sexual Activity    Alcohol use: Yes     Comment: occ    Drug use: Yes     Types: Cannabis     Comment: edibles once a month    Sexual activity: Yes   Other Topics Concern    Caffeine Concern Yes     Comment: 8 oz of coffee daily.    Exercise No    Seat Belt Yes     Social Determinants of Health      [facility], [facility]    Housing Stability     Family History:  Family History   Problem Relation Age of Onset    Diabetes Father     Hypertension Father     Other (Other) Father         thyroid    Diabetes Mother     Other (Other) Mother         hashimoto's    Breast Cancer Maternal Grandmother         dx age 70's    Cancer Maternal Grandfather         stomach dx 70s    Cancer Paternal Grandmother         stomach dx age 50s    Heart Disorder Paternal Grandfather         arrhythmia    Ovarian Cancer Paternal Aunt         dx age 40's    Ovarian Cancer Paternal Cousin Female 32    No Known Problems Sister     No Known Problems Sister      Allergies:  Allergies   Allergen Reactions    Other OTHER (SEE COMMENTS)     seasonal     Current Meds:  Current Outpatient Medications on File Prior to Visit   Medication Sig Dispense Refill    vilazodone 20 MG Oral Tab Take 1 tablet (20 mg total) by mouth daily with breakfast.      LORazepam 0.5 MG Oral Tab Take 1  tablet (0.5 mg total) by mouth daily as needed.       No current facility-administered medications on file prior to visit.         REVIEW OF SYSTEMS   Constitutional: no fatigue normal energy no weight changes   HENT: normal sinuses and no mouth issues   Eyes: . normal vision no eye pain   Respiratory: normal respirations no cough   Cardiovascular: no CP, or palpitations   Gastrointestinal: normal bowels and no abd pains   Genitourinary:  normal urination no hematuria, no frequency   Musculoskeletal: no pains in arms/legs, normal range of motion   Skin: no rashes or skin lesions that are new   Neurological:  no weakness, no numbness, normal gait   Hematological:  no bruises or bleeding   Psychiatric/Behavioral: normal mood no anxiety normal behavior     /61 (BP Location: Left arm, Patient Position: Sitting, Cuff Size: large)   Pulse 94   Temp 97.7 °F (36.5 °C) (Temporal)   Resp 16   Ht 5' 8.5\" (1.74 m)   Wt 264 lb 3.2 oz (119.8 kg)   LMP 09/12/2024 (Exact Date)   SpO2 98%   Breastfeeding No   BMI 39.59 kg/m²     PHYSICAL EXAM:   Constitutional: Vital signs reviewed as noted, well developed, in no acute distress.   HENT: NCAT  Eyes: pupils reactive bilaterally  Cardiovascular: nl s1 s2 no m/r/g  Pulmonary/Chest: CTA bilaterally with no wheezes  Extremities: no pedal edema   Neurological:  no weakness in UE and LE, reflexes are normal  Skin: no rashes or bruises on visualized skin, not undressed   Psychiatric:normal mood

## 2024-09-23 NOTE — PATIENT INSTRUCTIONS
Continue to exercise at least 150 minutes a week and Eat a plant based diet     Lets continue your medications for 3 months    See you back in 3 months

## 2024-12-20 NOTE — PROGRESS NOTES
Nephrology Consult Note    REASON FOR CONSULT: elevated serum creatinine  Referring Provider: Xochilt Farfan DO    HPI:   Viviana Covarrubias is a 34 year old female with history of Graves disease, anxiety and obesity who presents for evaluation and management of an elevated serum creatinine.     She has had evidence of an elevated serum creatinine of around 1.05-1.1 mg/dL since July 2023. Prior to this, her serum creatinine was ~1 mg/dL.    UA has had no protein or blood by dipstick and renal ultrasound was unremarkable without hydronephrosis, nephrolithiasis or echogenicity. Was taken off MMI for her Graves disease at the end of 2023 and was started on Topiramate and then Phentermine for weight loss. Believes she has lost weight and feels well. No issues urinating, no LE edema and no NSAIDs. No family history of kidney disease. Stays well hydrated.     ROS:    Denies fever/chills  Denies wt loss/gain  Denies HA or visual changes  Denies CP or palpitations  Denies SOB/cough/hemoptysis  Denies abd or flank pain  Denies N/V/D  Denies change in urinary habits or gross hematuria  Denies LE edema  Denies skin rashes/myalgias/arthralgias    PMH:  Past Medical History:    Allergic rhinitis    Anxiety    Depression    Graves disease    Obesity    Ovarian cyst    Pap smear for cervical cancer screening    Negative    PTSD (post-traumatic stress disorder)    using cannabis oil daily per medical card prescription       PSH:  Past Surgical History:   Procedure Laterality Date    Other surgical history  05/23/2018, 4/2022    left then right surgery for retinal issue    Other surgical history  04/2022    PRK eye surgery       Medications (Active prior to today's visit):  Current Outpatient Medications   Medication Sig Dispense Refill    Phentermine HCl 37.5 MG Oral Tab Take 1 tablet (37.5 mg total) by mouth every morning before breakfast. 30 tablet 2    topiramate 50 MG Oral Tab Take 1 tablet (50 mg total) by mouth daily.  90 tablet 0    vilazodone 20 MG Oral Tab Take 1 tablet (20 mg total) by mouth daily with breakfast.      LORazepam 0.5 MG Oral Tab Take 1 tablet (0.5 mg total) by mouth daily as needed. (Patient not taking: Reported on 12/20/2024)         Allergies:  Allergies[1]    Social History:  Social History     Socioeconomic History    Marital status: Single   Tobacco Use    Smoking status: Never    Smokeless tobacco: Never   Vaping Use    Vaping status: Never Used   Substance and Sexual Activity    Alcohol use: Yes     Comment: occ    Drug use: Yes     Types: Cannabis     Comment: edibles once a month    Sexual activity: Yes   Other Topics Concern    Caffeine Concern Yes     Comment: 8 oz of coffee daily.    Exercise No    Seat Belt Yes        Family History:  Denies family history of kidney disease.    PHYSICAL EXAM:   /80   Wt 266 lb 4 oz (120.8 kg)   LMP 09/12/2024 (Exact Date)   BMI 39.89 kg/m²    Wt Readings from Last 3 Encounters:   12/20/24 266 lb 4 oz (120.8 kg)   09/23/24 264 lb 3.2 oz (119.8 kg)   07/01/24 260 lb 12.8 oz (118.3 kg)     General: Alert and oriented in no apparent distress.  HEENT: No scleral icterus, MMM  Neck: Supple, no ISABELLE or thyromegaly  Cardiac: Regular rate and rhythm, S1, S2 normal, no murmur or rub  Lungs: Clear without wheezes, rales, rhonchi.    Abdomen: Soft, non-tender.   Extremities: Without clubbing, cyanosis or edema.  Neurologic:  normal affect,  moving all extremities  Skin: Warm and dry, no rashes    DATA:     Renal ultrasound 6/10/2024 reviewed, no hydronephrosis, normal sized kidneys, no calculi and normal echogenicity    BMP  Component  Ref Range & Units 10/25/24 12:42 PM   Glucose  70 - 99 mg/dL 85   Sodium  136 - 145 mmol/L 137   Potassium  3.5 - 5.1 mmol/L 3.7   Chloride  98 - 112 mmol/L 106   CO2  21.0 - 32.0 mmol/L 23.0   Anion Gap  0 - 18 mmol/L 8   BUN  9 - 23 mg/dL 13   Creatinine  0.55 - 1.02 mg/dL 1.10 High    Calcium, Total  8.7 - 10.4 mg/dL 10.0   Calculated  Osmolality  275 - 295 mOsm/kg 283   eGFR-Cr  >=60 mL/min/1.73m2 68   Patient Fasting for BMP? No       ASSESSMENT/PLAN:      1) Elevated serum creatinine/CKD stage 2: She has had a serum creatinine of around 1.05-1.1 mg/dL since around 7/2023. Reassuring that renal function has been overall stable since the increase. Renal ultrasound was unremarkable. Was started on Topiramate around the time, but no evidence of obstructing nephrolithiasis that could explain slight elevation in serum creatinine. There is no protein or blood by dipstick or abnormal urinary sediment on UA either. Will repeat BMP and check cystatin C to ensure serum creatinine is not being affected by outside factors. Will repeat UA and check UPC and UAC as well.     2) Obesity: On Phentermine and topiramate for weight loss.     3) Graves disease: History of Graves disease for which she was previously on MMI for. MMI tapered off by Endocrinology as she had been biochemically euthyroid.     Thank you for allowing me to participate in this patient's care. Please feel free to call me with any questions or concerns.     Amie Donaldson MD       [1] No Known Allergies

## 2024-12-23 NOTE — PATIENT INSTRUCTIONS
Try to add some diet and lifestyle modifications and lets continue your medications    You received the flu vaccine today and it may cause soreness of the arm for 24 hours    See me back in 3 months for a weight check

## 2024-12-23 NOTE — PROGRESS NOTES
Patient Office Visit    ASSESSMENT AND PLAN:   1. Morbid obesity with BMI of 45.0-49.9, adult ([facility])  Note: Start weight was 315 pounds.  Continue to work on diet and lifestyle modifications.  Will continue with Topamax and phentermine.    2. Anxiety  Note: Continue current regimen by the psychiatrist    3. KATIE (acute kidney injury) ([facility])  Note: Has seen the kidney specialist and has had extra workup done.  Will continue to monitor    Flu vaccine provided today  Return to clinic in 3 months      Patient/Caregiver Education: Patient/Caregiver Education: There are no barriers to learning. Medical education done. Outcome: Patient verbalizes understanding. Patient is notified to call with any questions, complications, allergies, or worsening or changing symptoms.  Patient is to call with any side effects or complications from the treatments as a result of today.      Reviewed Past Medical History and   Patient Active Problem List   Diagnosis    Contraceptive surveillance    Dyspareunia in female not due to substance or known physiological condition    Morbid obesity with BMI of 45.0-49.9, adult (HCC)    NAFLD (nonalcoholic fatty liver disease)    Graves disease    KATIE (acute kidney injury) ([facility])       Orders Placed This Encounter   Procedures    Fluzone trivalent vaccine, PF 0.5mL, 6mo+ (52987)     Requested Prescriptions      No prescriptions requested or ordered in this encounter         Xochilt Farfan DO  CC:  Chief Complaint   Patient presents with    Follow - Up         HPI:   Viviana Covarrubias is a 34 year old female who presents for a follow up    Obesity: She has not lost much weight as she is not able to exercise much but she will be changing her hours from 4 days a week to 5 days a week and thinks she will be able to exercise more.  She would like to continue the current regimen.  Anxiety: Following up with a therapist and doing well  KATIE: Saw the nephrologist and overall the nephrologist was not too  concerned and she is just having some blood test    Past Medical History:    Allergic rhinitis    Anxiety    Depression    Graves disease    Obesity    Ovarian cyst    Pap smear for cervical cancer screening    Negative    PTSD (post-traumatic stress disorder)    using cannabis oil daily per medical card prescription       Past Surgical History:   Procedure Laterality Date    Other surgical history  05/23/2018, 4/2022    left then right surgery for retinal issue    Other surgical history  04/2022    PRK eye surgery       Social History:  Social History     Socioeconomic History    Marital status: Single   Tobacco Use    Smoking status: Never    Smokeless tobacco: Never   Vaping Use    Vaping status: Never Used   Substance and Sexual Activity    Alcohol use: Yes     Comment: occ    Drug use: Yes     Types: Cannabis     Comment: edibles once a month    Sexual activity: Yes   Other Topics Concern    Caffeine Concern Yes     Comment: 8 oz of coffee daily.    Exercise No    Seat Belt Yes     Social Drivers of Health      Received from [facility], [facility]    Housing Stability     Family History:  Family History   Problem Relation Age of Onset    Diabetes Father     Hypertension Father     Other (Other) Father         thyroid    Diabetes Mother     Other (Other) Mother         hashimoto's    Breast Cancer Maternal Grandmother         dx age 70's    Cancer Maternal Grandfather         stomach dx 70s    Cancer Paternal Grandmother         stomach dx age 50s    Heart Disorder Paternal Grandfather         arrhythmia    Ovarian Cancer Paternal Aunt         dx age 40's    Ovarian Cancer Paternal Cousin Female 32    No Known Problems Sister     No Known Problems Sister      Allergies:  Allergies[1]  Current Meds:  Medications Ordered Prior to Encounter[2]      REVIEW OF SYSTEMS   Constitutional: no fatigue normal energy no weight changes   HENT: normal sinuses and no mouth issues   Eyes:  . normal vision no eye pain   Respiratory: normal respirations no cough   Cardiovascular: no CP, or palpitations   Gastrointestinal: normal bowels and no abd pains   Genitourinary:  normal urination no hematuria, no frequency   Musculoskeletal: no pains in arms/legs, normal range of motion   Skin: no rashes or skin lesions that are new   Neurological:  no weakness, no numbness, normal gait   Hematological:  no bruises or bleeding   Psychiatric/Behavioral: normal mood no anxiety normal behavior     /80 (BP Location: Right arm, Patient Position: Sitting, Cuff Size: large)   Pulse 76   Temp 97.6 °F (36.4 °C) (Temporal)   Resp 16   Ht 5' 8.5\" (1.74 m)   Wt 267 lb (121.1 kg)   LMP 12/21/2024 (Exact Date)   SpO2 100%   BMI 40.01 kg/m²     PHYSICAL EXAM:   Constitutional: Vital signs reviewed as noted, well developed, in no acute distress.   HENT: NCAT  Eyes: pupils reactive bilaterally  Cardiovascular: nl s1 s2 no m/r/g  Pulmonary/Chest: CTA bilaterally with no wheezes  Extremities: no pedal edema   Neurological:  no weakness in UE and LE, reflexes are normal  Skin: no rashes or bruises on visualized skin, not undressed   Psychiatric:normal mood              [1] No Known Allergies  [2]   Current Outpatient Medications on File Prior to Visit   Medication Sig Dispense Refill    Phentermine HCl 37.5 MG Oral Tab Take 1 tablet (37.5 mg total) by mouth every morning before breakfast. 30 tablet 2    topiramate 50 MG Oral Tab Take 1 tablet (50 mg total) by mouth daily. 90 tablet 0    vilazodone 20 MG Oral Tab Take 1 tablet (20 mg total) by mouth daily with breakfast.      LORazepam 0.5 MG Oral Tab Take 1 tablet (0.5 mg total) by mouth daily as needed.       No current facility-administered medications on file prior to visit.

## 2025-01-09 NOTE — TELEPHONE ENCOUNTER
Protocol FAIL    Requesting: Phentermine HCl 37.5 MG Oral Tab     LOV: 12/23/24  RTC: 3 MONTHS  Filled: 9/23/24 30 TABLET 2 REFILL  Recent Labs: 10/25/24    Upcoming OV   Future Appointments   Date Time Provider Department Center   4/17/2025  9:15 AM [facility] [place]   4/17/2025  9:30 AM Xochilt Farfan,  [facility] [place]   11/26/2025 11:30 AM Pau Anderson APN [facility] [place]

## 2025-02-17 NOTE — TELEPHONE ENCOUNTER
Protocol failed    .Requesting phentermine   LOV: 12/243/24  RTC: 04/17/24  Last Relevant Labs:   Filled: 01/09/2025 #30 with 0 refills    Future Appointments   Date Time Provider Department Center   4/17/2025  9:15 AM [facility] [place]   4/17/2025  9:30 AM Xochilt Farfan,  [facility] 8 [facility] [place]   11/26/2025 11:30 AM Pau Anderson APN [facility] [place]

## (undated) NOTE — ED AVS SNAPSHOT
[facility] in [address] [place] [address] Drive,[address] Floor    600 [facility]    Phone:  [phone]    Fax:  [phone]   MRN: [id number]    Department:  [facility] in [facility] END   Date of Visit:  1/10/2017 immediate care  · Mucinex to help with thinning out and draining secretions  · Postural drainage discussed - after steaming and rinses sinuses with nasal saline   · Salt water gargling at least 3 times a day  · Adequate rest and hydration emphasized  · Vit primary care or a specialist physician for a follow-up visit, please tell this physician (or your personal doctor if your instructions are to return to your personal doctor) about any new or lasting problems.  The primary care or specialist physician will s We are concerned for your overall well being:    - If you are a smoker or have smoked in the last 12 months, we encourage you to explore options for quitting.     - If you have concerns related to behavioral health issues or thoughts of harming yourself, Enter your [employer] Activation Code exactly as it appears below along with your Zip Code and Date of Birth to complete the sign-up process. If you do not sign up before the expiration date, you must request a new code.     Your unique [employer] Access Code: [email or web address]

## (undated) NOTE — MR AVS SNAPSHOT
Mia Bess  [address], [facility] [address]  [place] [id number]               Thank you for choosing us for your health care visit with Caleb Mae MD.  We are glad to serve you and happy to provide you with this sum MedroxyPROGESTERone Acetate (DEPO-PROVERA) 150 MG/ML injection 150 mg                    MyChart     Visit MyChart  You can access your MyChart to more actively manage your health care and view more details from this visit by going to [email or web address]. eeneeta 2 ½ hours per week – spread out over time Use a parag to keep you motivated   Don’t forget strength training with weights and resistance Set goals and track your progress   You don’t need to join a gym. Home exercises work great.  Put more priority on exe

## (undated) NOTE — ED AVS SNAPSHOT
[facility] in [address] [place] [address] Drive,[address]Th Floor    600 [facility]    Phone:  [phone]    Fax:  [phone]           Ms. Robbi Sacks   MRN: [id number]    Department:  [facility]   Date of Visi Take Tylenol and Motrin for fever and pain. Increase fluids, keep well-hydrated. Use over-the-counter Flonase 1 spray in each nostril twice a day for next 30 days. Steam inhalation can be helpful.     Discharge References/Attachments     OTITIS MEDIA, AN care or specialist physician will see patients referred from the [facility]. Follow-up care is at the discretion of that Physician.     IF THERE IS ANY CHANGE OR WORSENING OF YOUR CONDITION, CALL YOUR PRIMARY CARE PHYSICIAN AT ONCE OR GO TO THE harming yourself, contact [facility] at [phone]. - If you don’t have insurance, Damaris Hendricks has partnered with Patient [address] to help you get signed up for insurance coverage.   Patient [place]

## (undated) NOTE — ED AVS SNAPSHOT
Ms. Abril Carranza   MRN: [id number]    Department:  [facility] Emergency Department   Date of Visit:  2/21/2020           Disclosure     Insurance plans vary and the physician(s) referred by the ER may not be covered by your plan.  P to a primary care or a specialist physician for a follow-up visit, please tell this physician (or your personal doctor if your instructions are to return to your personal doctor) about any new or lasting problems.  The primary care or specialist physician w

## (undated) NOTE — LETTER
[facility]  [address]  [facility] [address]  [phone]    12/20/24          Xochilt Farfan DO  [address]  [facility] [address]          Patient: Viviana Covarrubias   YOB: 1990   Date of Visit: 12/20/2024     Dear Dr. Naheed DO,      I had the pleasure of seeing Viviana Covarrubias in my office.  Please find my progress note below.  Please do not hesitate to contact me with any questions or concerns.    Nephrology Consult Note    REASON FOR CONSULT: elevated serum creatinine  Referring Provider: Xochilt Farfan DO    HPI:   Viviana Covarrubias is a 34 year old female with history of Graves disease, anxiety and obesity who presents for evaluation and management of an elevated serum creatinine.     She has had evidence of an elevated serum creatinine of around 1.05-1.1 mg/dL since July 2023. Prior to this, her serum creatinine was ~1 mg/dL.    UA has had no protein or blood by dipstick and renal ultrasound was unremarkable without hydronephrosis, nephrolithiasis or echogenicity. Was taken off MMI for her Graves disease at the end of 2023 and was started on Topiramate and then Phentermine for weight loss. Believes she has lost weight and feels well. No issues urinating, no LE edema and no NSAIDs. No family history of kidney disease. Stays well hydrated.     ROS:    Denies fever/chills  Denies wt loss/gain  Denies HA or visual changes  Denies CP or palpitations  Denies SOB/cough/hemoptysis  Denies abd or flank pain  Denies N/V/D  Denies change in urinary habits or gross hematuria  Denies LE edema  Denies skin rashes/myalgias/arthralgias    PMH:  Past Medical History:    Allergic rhinitis    Anxiety    Depression    Graves disease    Obesity    Ovarian cyst    Pap smear for cervical cancer screening    Negative    PTSD (post-traumatic stress disorder)    using cannabis oil daily per medical card prescription       PSH:  Past Surgical  History:   Procedure Laterality Date    Other surgical history  05/23/2018, 4/2022    left then right surgery for retinal issue    Other surgical history  04/2022    PRK eye surgery       Medications (Active prior to today's visit):  Current Outpatient Medications   Medication Sig Dispense Refill    Phentermine HCl 37.5 MG Oral Tab Take 1 tablet (37.5 mg total) by mouth every morning before breakfast. 30 tablet 2    topiramate 50 MG Oral Tab Take 1 tablet (50 mg total) by mouth daily. 90 tablet 0    vilazodone 20 MG Oral Tab Take 1 tablet (20 mg total) by mouth daily with breakfast.      LORazepam 0.5 MG Oral Tab Take 1 tablet (0.5 mg total) by mouth daily as needed. (Patient not taking: Reported on 12/20/2024)         Allergies:  Allergies[1]    Social History:  Social History     Socioeconomic History    Marital status: Single   Tobacco Use    Smoking status: Never    Smokeless tobacco: Never   Vaping Use    Vaping status: Never Used   Substance and Sexual Activity    Alcohol use: Yes     Comment: occ    Drug use: Yes     Types: Cannabis     Comment: edibles once a month    Sexual activity: Yes   Other Topics Concern    Caffeine Concern Yes     Comment: 8 oz of coffee daily.    Exercise No    Seat Belt Yes        Family History:  Denies family history of kidney disease.    PHYSICAL EXAM:   /80   Wt 266 lb 4 oz (120.8 kg)   LMP 09/12/2024 (Exact Date)   BMI 39.89 kg/m²    Wt Readings from Last 3 Encounters:   12/20/24 266 lb 4 oz (120.8 kg)   09/23/24 264 lb 3.2 oz (119.8 kg)   07/01/24 260 lb 12.8 oz (118.3 kg)     General: Alert and oriented in no apparent distress.  HEENT: No scleral icterus, MMM  Neck: Supple, no ISABELLE or thyromegaly  Cardiac: Regular rate and rhythm, S1, S2 normal, no murmur or rub  Lungs: Clear without wheezes, rales, rhonchi.    Abdomen: Soft, non-tender.   Extremities: Without clubbing, cyanosis or edema.  Neurologic:  normal affect,  moving all extremities  Skin: Warm and dry, no  arielle    DATA:     Renal ultrasound 6/10/2024 reviewed, no hydronephrosis, normal sized kidneys, no calculi and normal echogenicity    BMP  Component  Ref Range & Units 10/25/24 12:42 PM   Glucose  70 - 99 mg/dL 85   Sodium  136 - 145 mmol/L 137   Potassium  3.5 - 5.1 mmol/L 3.7   Chloride  98 - 112 mmol/L 106   CO2  21.0 - 32.0 mmol/L 23.0   Anion Gap  0 - 18 mmol/L 8   BUN  9 - 23 mg/dL 13   Creatinine  0.55 - 1.02 mg/dL 1.10 High    Calcium, Total  8.7 - 10.4 mg/dL 10.0   Calculated Osmolality  275 - 295 mOsm/kg 283   eGFR-Cr  >=60 mL/min/1.73m2 68   Patient Fasting for BMP? No       ASSESSMENT/PLAN:      1) Elevated serum creatinine/CKD stage 2: She has had a serum creatinine of around 1.05-1.1 mg/dL since around 7/2023. Reassuring that renal function has been overall stable since the increase. Renal ultrasound was unremarkable. Was started on Topiramate around the time, but no evidence of obstructing nephrolithiasis that could explain slight elevation in serum creatinine. There is no protein or blood by dipstick or abnormal urinary sediment on UA either. Will repeat BMP and check cystatin C to ensure serum creatinine is not being affected by outside factors. Will repeat UA and check UPC and UAC as well.     2) Obesity: On Phentermine and topiramate for weight loss.     3) Graves disease: History of Graves disease for which she was previously on MMI for. MMI tapered off by Endocrinology as she had been biochemically euthyroid.     Thank you for allowing me to participate in this patient's care. Please feel free to call me with any questions or concerns.     Amie Donaldson MD       [1] No Known Allergies         Sincerely,   Amie Donaldson MD         Document electronically generated by:  Amie Donaldson MD

## (undated) NOTE — ED AVS SNAPSHOT
[facility] in [address] [place] [address] Drive,[address]Th Floor    600 [facility]    Phone:  [phone]    Fax:  [phone]   MRN: [id number]    Department:  [facility] in [facility]   Date of Visit:  1/14/2017 Where to Get Your Medications      These medications were sent to [facility], [facility], [phone], [facility], [address] [place] [address]     Phone:  [phone]    - Albuterol You were examined and treated today on an urgent basis only. This was not a substitute for ongoing medical care. Often, one Immediate Care visit does not uncover every injury or illness.  If you have been referred to a primary care or a specialist physicia Abelino Vela ([address]) [phone] [address]) [phone]                Additional Information       We are concerned for your overall well being:    - If you are a smoker o

## (undated) NOTE — MR AVS SNAPSHOT
After Visit Summary   5/20/2017    Patricia Portillo    MRN: [id number]           Visit Information        Provider Department Dept Phone    5/20/2017 11:00 AM Sarahi Coughlin MD [facility] [phone]      Allergies as of 5/20/2017  Reviewed o

## (undated) NOTE — MR AVS SNAPSHOT
After Visit Summary   1/7/2020    Giovany Garcia    MRN: [id number]           Visit Information     Date & Time  1/7/2020 10:45 AM Provider  Astrid Stephens  Department  [facility] [address]  Dept.  Phone  [phone] complete it and provide feedback. We strive to deliver the best patient experience and are looking for ways to make improvements. Your feedback will help us do so. For more information on [employer], please visit [email or web address] Life-threatening emergencies needing immediate intervention at a hospital emergency room.     Average cost  $2,300*   *Cost varies based on your insurance coverage  For more information about hours, locations or appointment options available at [facility],   visit